# Patient Record
Sex: MALE | Race: WHITE | NOT HISPANIC OR LATINO | Employment: OTHER | URBAN - METROPOLITAN AREA
[De-identification: names, ages, dates, MRNs, and addresses within clinical notes are randomized per-mention and may not be internally consistent; named-entity substitution may affect disease eponyms.]

---

## 2023-03-02 LAB
CAMPYLOBACTER GP: NOT DETECTED
NOROVIRUS GI/GII: NOT DETECTED
ROTAVIRUS A: NOT DETECTED
SALMONELLA SP.: NOT DETECTED
SHIGA TOXIN 1: NOT DETECTED
SHIGA TOXIN 2: NOT DETECTED
SHIGELLA SP.: NOT DETECTED
VIBRIO GRP.: NOT DETECTED
YERSINIA ENTEROCOLITICA: NOT DETECTED

## 2023-03-03 LAB — C. DIFFICILE TOXIN, PCR: NOT DETECTED

## 2023-03-07 LAB
CRYPTOSPORIDIUM ANTIGEN-DATA CONVERSION: NEGATIVE
GIARDIA LAMBLIA AG-DATA CONVERSION: NEGATIVE
OVA + PARASITE EXAM: NEGATIVE
PANCREATIC ELASTASE, FECAL: 307 UG/G

## 2023-05-12 ENCOUNTER — HOSPITAL ENCOUNTER (OUTPATIENT)
Dept: DATA CONVERSION | Facility: HOSPITAL | Age: 68
End: 2023-05-12
Attending: INTERNAL MEDICINE | Admitting: INTERNAL MEDICINE
Payer: MEDICARE

## 2023-05-12 DIAGNOSIS — Z86.73 PERSONAL HISTORY OF TRANSIENT ISCHEMIC ATTACK (TIA), AND CEREBRAL INFARCTION WITHOUT RESIDUAL DEFICITS: ICD-10-CM

## 2023-05-12 DIAGNOSIS — I25.119 ATHEROSCLEROTIC HEART DISEASE OF NATIVE CORONARY ARTERY WITH UNSPECIFIED ANGINA PECTORIS (CMS-HCC): ICD-10-CM

## 2023-05-12 DIAGNOSIS — I25.2 OLD MYOCARDIAL INFARCTION: ICD-10-CM

## 2023-05-12 DIAGNOSIS — I73.9 PERIPHERAL VASCULAR DISEASE, UNSPECIFIED (CMS-HCC): ICD-10-CM

## 2023-05-12 DIAGNOSIS — I25.10 ATHEROSCLEROTIC HEART DISEASE OF NATIVE CORONARY ARTERY WITHOUT ANGINA PECTORIS: ICD-10-CM

## 2023-05-12 DIAGNOSIS — Z87.891 PERSONAL HISTORY OF NICOTINE DEPENDENCE: ICD-10-CM

## 2023-05-12 DIAGNOSIS — T82.855A STENOSIS OF CORONARY ARTERY STENT, INITIAL ENCOUNTER: ICD-10-CM

## 2023-05-12 DIAGNOSIS — I20.89 OTHER FORMS OF ANGINA PECTORIS (CMS-HCC): ICD-10-CM

## 2023-05-23 DIAGNOSIS — I25.10 ATHEROSCLEROTIC HEART DISEASE OF NATIVE CORONARY ARTERY WITHOUT ANGINA PECTORIS: ICD-10-CM

## 2023-05-23 DIAGNOSIS — E11.51 TYPE 2 DIABETES MELLITUS WITH DIABETIC PERIPHERAL ANGIOPATHY WITHOUT GANGRENE (MULTI): ICD-10-CM

## 2023-05-23 DIAGNOSIS — E11.65 TYPE 2 DIABETES MELLITUS WITH HYPERGLYCEMIA (MULTI): ICD-10-CM

## 2023-05-26 RX ORDER — SITAGLIPTIN 100 MG/1
TABLET, FILM COATED ORAL
Qty: 30 TABLET | Refills: 6 | OUTPATIENT
Start: 2023-05-26

## 2023-05-26 RX ORDER — METOPROLOL TARTRATE 25 MG/1
TABLET, FILM COATED ORAL
Qty: 180 TABLET | Refills: 1 | OUTPATIENT
Start: 2023-05-26

## 2023-06-07 DIAGNOSIS — E78.5 HYPERLIPIDEMIA, UNSPECIFIED: ICD-10-CM

## 2023-06-09 RX ORDER — ATORVASTATIN CALCIUM 80 MG/1
TABLET, FILM COATED ORAL
Qty: 90 TABLET | Refills: 3 | Status: SHIPPED | OUTPATIENT
Start: 2023-06-09 | End: 2023-10-18 | Stop reason: SDUPTHER

## 2023-09-07 VITALS — BODY MASS INDEX: 27.03 KG/M2 | WEIGHT: 162.26 LBS | HEIGHT: 65 IN

## 2023-09-16 PROBLEM — I73.9 PAD (PERIPHERAL ARTERY DISEASE) (CMS-HCC): Status: ACTIVE | Noted: 2023-09-16

## 2023-09-16 PROBLEM — G45.9 TIA (TRANSIENT ISCHEMIC ATTACK): Status: ACTIVE | Noted: 2023-09-16

## 2023-09-16 PROBLEM — E11.9 DIABETES MELLITUS TYPE 2, NONINSULIN DEPENDENT (MULTI): Status: ACTIVE | Noted: 2023-09-16

## 2023-09-16 PROBLEM — R19.7 DIARRHEA: Status: ACTIVE | Noted: 2023-09-16

## 2023-09-16 PROBLEM — K58.9 IBS (IRRITABLE BOWEL SYNDROME): Status: ACTIVE | Noted: 2023-09-16

## 2023-09-16 PROBLEM — N40.0 BPH (BENIGN PROSTATIC HYPERPLASIA): Status: ACTIVE | Noted: 2023-09-16

## 2023-09-16 PROBLEM — K27.4 PEPTIC ULCER WITH HEMORRHAGE: Status: ACTIVE | Noted: 2023-09-16

## 2023-09-16 PROBLEM — E78.5 HYPERLIPIDEMIA: Status: ACTIVE | Noted: 2023-09-16

## 2023-09-16 PROBLEM — I10 HYPERTENSION: Status: ACTIVE | Noted: 2023-09-16

## 2023-09-16 PROBLEM — I25.10 CAD (CORONARY ARTERY DISEASE): Status: ACTIVE | Noted: 2023-09-16

## 2023-09-16 PROBLEM — K21.9 GERD (GASTROESOPHAGEAL REFLUX DISEASE): Status: ACTIVE | Noted: 2023-09-16

## 2023-09-16 RX ORDER — TAMSULOSIN HYDROCHLORIDE 0.4 MG/1
1 CAPSULE ORAL NIGHTLY
COMMUNITY
Start: 2021-04-27 | End: 2023-11-08 | Stop reason: SDUPTHER

## 2023-09-16 RX ORDER — ACETAMINOPHEN 325 MG/1
650 TABLET ORAL EVERY 6 HOURS PRN
COMMUNITY
Start: 2021-10-25

## 2023-09-16 RX ORDER — CLOPIDOGREL BISULFATE 75 MG/1
75 TABLET ORAL DAILY
COMMUNITY
End: 2023-10-18 | Stop reason: SDUPTHER

## 2023-09-16 RX ORDER — CETIRIZINE HYDROCHLORIDE 10 MG/1
10 TABLET ORAL DAILY
COMMUNITY
Start: 2023-05-23

## 2023-09-16 RX ORDER — CYCLOBENZAPRINE HCL 5 MG
10 TABLET ORAL 3 TIMES DAILY PRN
COMMUNITY
Start: 2022-09-16 | End: 2023-10-18 | Stop reason: ALTCHOICE

## 2023-09-16 RX ORDER — TRIAMCINOLONE ACETONIDE 1 MG/G
CREAM TOPICAL 2 TIMES DAILY
COMMUNITY
Start: 2023-02-14 | End: 2024-03-25

## 2023-09-16 RX ORDER — SITAGLIPTIN 100 MG/1
100 TABLET, FILM COATED ORAL DAILY
COMMUNITY
End: 2023-10-18 | Stop reason: SINTOL

## 2023-09-16 RX ORDER — ACETAMINOPHEN, DIPHENHYDRAMINE HCL, PHENYLEPHRINE HCL 325; 25; 5 MG/1; MG/1; MG/1
TABLET ORAL
COMMUNITY
Start: 2021-10-25

## 2023-09-16 RX ORDER — DICYCLOMINE HYDROCHLORIDE 10 MG/1
10 CAPSULE ORAL
COMMUNITY
Start: 2023-05-23 | End: 2023-10-18 | Stop reason: ALTCHOICE

## 2023-09-16 RX ORDER — RANOLAZINE 500 MG/1
500 TABLET, EXTENDED RELEASE ORAL EVERY 12 HOURS
COMMUNITY

## 2023-09-16 RX ORDER — METOPROLOL TARTRATE 25 MG/1
1 TABLET, FILM COATED ORAL 2 TIMES DAILY
COMMUNITY
Start: 2019-10-15 | End: 2023-10-18 | Stop reason: SDUPTHER

## 2023-09-16 RX ORDER — ISOSORBIDE MONONITRATE 120 MG/1
120 TABLET, EXTENDED RELEASE ORAL NIGHTLY
COMMUNITY
Start: 2023-05-02 | End: 2023-10-18 | Stop reason: SDUPTHER

## 2023-09-16 RX ORDER — APIXABAN 5 MG/1
5 TABLET, FILM COATED ORAL 2 TIMES DAILY
COMMUNITY
End: 2023-10-18 | Stop reason: SDUPTHER

## 2023-09-16 RX ORDER — LISINOPRIL 10 MG/1
10 TABLET ORAL DAILY
COMMUNITY
End: 2023-10-18 | Stop reason: SDUPTHER

## 2023-09-16 RX ORDER — POTASSIUM CHLORIDE 1500 MG/1
20 TABLET, EXTENDED RELEASE ORAL 2 TIMES DAILY
COMMUNITY
Start: 2023-02-17 | End: 2023-10-18

## 2023-09-16 RX ORDER — AMMONIUM LACTATE 12 G/100G
LOTION TOPICAL
COMMUNITY
Start: 2023-02-14

## 2023-09-16 RX ORDER — PANTOPRAZOLE SODIUM 40 MG/1
40 TABLET, DELAYED RELEASE ORAL
COMMUNITY
End: 2023-10-18

## 2023-09-16 RX ORDER — ISOSORBIDE MONONITRATE 60 MG/1
60 TABLET, EXTENDED RELEASE ORAL DAILY
COMMUNITY
End: 2023-10-18 | Stop reason: SDUPTHER

## 2023-09-16 RX ORDER — ONDANSETRON 8 MG/1
8 TABLET, ORALLY DISINTEGRATING ORAL EVERY 8 HOURS PRN
COMMUNITY
End: 2024-03-25

## 2023-09-16 RX ORDER — CLOTRIMAZOLE AND BETAMETHASONE DIPROPIONATE 10; .64 MG/G; MG/G
1 CREAM TOPICAL 2 TIMES DAILY
COMMUNITY
Start: 2023-03-31

## 2023-10-04 ENCOUNTER — APPOINTMENT (OUTPATIENT)
Dept: UROLOGY | Facility: CLINIC | Age: 68
End: 2023-10-04
Payer: MEDICARE

## 2023-10-18 ENCOUNTER — OFFICE VISIT (OUTPATIENT)
Dept: CARDIOLOGY | Facility: HOSPITAL | Age: 68
End: 2023-10-18
Payer: MEDICARE

## 2023-10-18 VITALS
SYSTOLIC BLOOD PRESSURE: 122 MMHG | HEART RATE: 61 BPM | WEIGHT: 156.97 LBS | BODY MASS INDEX: 25.38 KG/M2 | OXYGEN SATURATION: 97 % | DIASTOLIC BLOOD PRESSURE: 77 MMHG

## 2023-10-18 DIAGNOSIS — I10 ESSENTIAL HYPERTENSION: ICD-10-CM

## 2023-10-18 DIAGNOSIS — E78.5 HYPERLIPIDEMIA, UNSPECIFIED: ICD-10-CM

## 2023-10-18 DIAGNOSIS — I25.10 CORONARY ARTERY DISEASE INVOLVING NATIVE HEART WITHOUT ANGINA PECTORIS, UNSPECIFIED VESSEL OR LESION TYPE: Primary | ICD-10-CM

## 2023-10-18 DIAGNOSIS — I48.91 ATRIAL FIBRILLATION, UNSPECIFIED TYPE (MULTI): ICD-10-CM

## 2023-10-18 PROCEDURE — 1160F RVW MEDS BY RX/DR IN RCRD: CPT | Performed by: NURSE PRACTITIONER

## 2023-10-18 PROCEDURE — 3074F SYST BP LT 130 MM HG: CPT | Performed by: NURSE PRACTITIONER

## 2023-10-18 PROCEDURE — 99213 OFFICE O/P EST LOW 20 MIN: CPT | Mod: PO | Performed by: NURSE PRACTITIONER

## 2023-10-18 PROCEDURE — 3078F DIAST BP <80 MM HG: CPT | Performed by: NURSE PRACTITIONER

## 2023-10-18 PROCEDURE — 1159F MED LIST DOCD IN RCRD: CPT | Performed by: NURSE PRACTITIONER

## 2023-10-18 PROCEDURE — 3046F HEMOGLOBIN A1C LEVEL >9.0%: CPT | Performed by: NURSE PRACTITIONER

## 2023-10-18 PROCEDURE — 4010F ACE/ARB THERAPY RXD/TAKEN: CPT | Performed by: NURSE PRACTITIONER

## 2023-10-18 PROCEDURE — 1125F AMNT PAIN NOTED PAIN PRSNT: CPT | Performed by: NURSE PRACTITIONER

## 2023-10-18 PROCEDURE — 99213 OFFICE O/P EST LOW 20 MIN: CPT | Performed by: NURSE PRACTITIONER

## 2023-10-18 PROCEDURE — 1036F TOBACCO NON-USER: CPT | Performed by: NURSE PRACTITIONER

## 2023-10-18 RX ORDER — ISOSORBIDE MONONITRATE 120 MG/1
120 TABLET, EXTENDED RELEASE ORAL NIGHTLY
Qty: 90 TABLET | Refills: 3 | Status: SHIPPED | OUTPATIENT
Start: 2023-10-18 | End: 2023-11-29 | Stop reason: SDUPTHER

## 2023-10-18 RX ORDER — LISINOPRIL 10 MG/1
10 TABLET ORAL DAILY
Qty: 90 TABLET | Refills: 3 | Status: SHIPPED | OUTPATIENT
Start: 2023-10-18 | End: 2024-10-17

## 2023-10-18 RX ORDER — APIXABAN 5 MG/1
5 TABLET, FILM COATED ORAL 2 TIMES DAILY
Qty: 180 TABLET | Refills: 3 | Status: SHIPPED | OUTPATIENT
Start: 2023-10-18 | End: 2024-10-17

## 2023-10-18 RX ORDER — CLOPIDOGREL BISULFATE 75 MG/1
75 TABLET ORAL DAILY
Qty: 90 TABLET | Refills: 3 | Status: SHIPPED | OUTPATIENT
Start: 2023-10-18 | End: 2024-10-17

## 2023-10-18 RX ORDER — METOPROLOL TARTRATE 25 MG/1
25 TABLET, FILM COATED ORAL 2 TIMES DAILY
Qty: 180 TABLET | Refills: 3 | Status: SHIPPED | OUTPATIENT
Start: 2023-10-18 | End: 2024-10-17

## 2023-10-18 RX ORDER — ATORVASTATIN CALCIUM 80 MG/1
80 TABLET, FILM COATED ORAL NIGHTLY
Qty: 90 TABLET | Refills: 3 | Status: SHIPPED | OUTPATIENT
Start: 2023-10-18

## 2023-10-18 ASSESSMENT — ENCOUNTER SYMPTOMS
PSYCHIATRIC NEGATIVE: 1
DYSPNEA ON EXERTION: 1
MYALGIAS: 1
EYES NEGATIVE: 1
NEUROLOGICAL NEGATIVE: 1
HEMATOLOGIC/LYMPHATIC NEGATIVE: 1
RESPIRATORY NEGATIVE: 1
GASTROINTESTINAL NEGATIVE: 1
ENDOCRINE NEGATIVE: 1
DEPRESSION: 0

## 2023-10-18 NOTE — PROGRESS NOTES
Referred by Dr. Francis ref. provider found for Follow-up (Routine.)     History Of Present Illness:    Amor Guzmán is a very pleasant 68 year old gentleman with a history of CAD, PVD, HTN and HLD, he is here for a follow up visit. The patient is seen in collaboration with Dr. Woodruff. Mr. Guzmán was admitted in August with chest pain, the pain has since resolved. He was started on Insulin due to elevated HgA1C. He states his sugar has been better controlled. Denies chest pain or shortness of breath. Occasional tightness in his his neck. He is walking daily and staying active.     Review of Systems   Constitutional: Positive for malaise/fatigue.   HENT: Negative.     Eyes: Negative.    Cardiovascular:  Positive for dyspnea on exertion.   Respiratory: Negative.     Endocrine: Negative.    Hematologic/Lymphatic: Negative.    Skin: Negative.    Musculoskeletal:  Positive for arthritis, muscle weakness and myalgias.   Gastrointestinal: Negative.    Neurological: Negative.    Psychiatric/Behavioral: Negative.        Past Medical History:  He has a past medical history of Chronic or unspecified duodenal ulcer with hemorrhage (03/12/2021), Encounter for follow-up examination after completed treatment for conditions other than malignant neoplasm (11/01/2021), Encounter for general adult medical examination without abnormal findings (04/15/2022), Encounter for preprocedural cardiovascular examination (09/18/2020), Encounter for screening for malignant neoplasm of colon (01/14/2022), Encounter for screening for other viral diseases (01/05/2021), Enterocolitis due to Clostridium difficile, not specified as recurrent (11/02/2021), Immunization not carried out because of patient refusal (10/16/2020), Immunization not carried out because of patient refusal (10/16/2020), Infection following a procedure, other surgical site, initial encounter (11/12/2021), Infection following a procedure, other surgical site, initial encounter  (11/12/2021), Infectious gastroenteritis and colitis, unspecified (03/17/2021), Low back pain, unspecified (09/29/2021), Male erectile dysfunction, unspecified (07/08/2021), Other abnormal glucose (10/16/2020), Other conditions influencing health status (04/15/2022), Other forms of angina pectoris (07/08/2021), Other specified diseases of gallbladder (09/02/2020), Other specified diseases of gallbladder (10/07/2020), Other specified symptoms and signs involving the circulatory and respiratory systems (09/24/2021), Pain in unspecified hip (09/01/2021), Pain in unspecified thigh (09/01/2021), Personal history of diseases of the blood and blood-forming organs and certain disorders involving the immune mechanism (11/01/2021), Personal history of diseases of the skin and subcutaneous tissue (11/12/2021), Personal history of other diseases of the digestive system (11/02/2021), Personal history of other diseases of the musculoskeletal system and connective tissue (09/29/2021), Personal history of other endocrine, nutritional and metabolic disease (09/21/2021), Personal history of other endocrine, nutritional and metabolic disease (11/01/2021), Personal history of other infectious and parasitic diseases (11/12/2021), Personal history of other malignant neoplasm of large intestine (09/16/2021), Personal history of other malignant neoplasm of large intestine, Personal history of other specified conditions (06/24/2021), Personal history of other specified conditions (03/17/2021), Personal history of other specified conditions (09/21/2021), Personal history of peptic ulcer disease, Presence of aortocoronary bypass graft (11/17/2021), Presence of aortocoronary bypass graft (11/22/2021), and Unspecified systolic (congestive) heart failure (CMS/HCC) (09/24/2021).    Past Surgical History:  He has a past surgical history that includes Other surgical history (10/16/2020); Other surgical history (10/16/2020); Other surgical history  (11/01/2021); MR angio neck wo IV contrast (8/26/2022); MR angio head wo IV contrast (8/26/2022); CT angio neck w and wo IV contrast (8/26/2022); and CT angio head w and wo IV contrast (8/26/2022).      Social History:  He reports that he quit smoking about 20 years ago. His smoking use included cigarettes. He smoked an average of .25 packs per day. He has never used smokeless tobacco. He reports that he does not currently use alcohol. He reports that he does not use drugs.    Family History:  Family History   Problem Relation Name Age of Onset    Other (cardiac disorder) Mother      Hypertension Mother      Lung cancer Mother      Other (cardiac disorder) Father      Hypertension Father      Prostate cancer Father          Allergies:  Patient has no known allergies.    Outpatient Medications:  Current Outpatient Medications   Medication Instructions    acetaminophen (TYLENOL) 650 mg, oral, Every 6 hours PRN    ammonium lactate (Lac-Hydrin) 12 % lotion  Apply to affected area as needed for dry skin.<BR>    atorvastatin (Lipitor) 80 mg tablet take 1 tablet by mouth at bedtime    cetirizine (ZYRTEC) 10 mg, oral, Daily    clopidogrel (PLAVIX) 75 mg, oral, Daily    clotrimazole-betamethasone (Lotrisone) cream 1 Application, Topical, 2 times daily    Eliquis 5 mg, oral, 2 times daily    empagliflozin (JARDIANCE ORAL) oral, Daily    isosorbide mononitrate ER (IMDUR) 120 mg, oral, Nightly    lisinopril 10 mg, oral, Daily    melatonin 10 mg tablet oral, 1 qhs prn    metoprolol tartrate (Lopressor) 25 mg tablet 1 tablet, oral, 2 times daily    multivitamin with minerals (MULTIPLE VITAMIN-MINERALS ORAL) 1 tablet, oral, Daily    ondansetron ODT (ZOFRAN-ODT) 8 mg, oral, Every 8 hours PRN    ranolazine (RANEXA) 500 mg, oral, Every 12 hours    tamsulosin (Flomax) 0.4 mg 24 hr capsule 1 capsule, oral, Nightly    triamcinolone (Kenalog) 0.1 % cream 2 times daily        Last Recorded Vitals:  Vitals:    10/18/23 1340   BP: 122/77    Pulse: 61   SpO2: 97%   Weight: 71.2 kg (156 lb 15.5 oz)       Physical Exam:  Physical Exam  Vitals reviewed.   HENT:      Head: Normocephalic.      Nose: Nose normal.   Eyes:      Pupils: Pupils are equal, round, and reactive to light.   Cardiovascular:      Rate and Rhythm: Normal rate and regular rhythm.   Pulmonary:      Effort: Pulmonary effort is normal.      Breath sounds: Normal breath sounds.   Abdominal:      General: Abdomen is flat.      Palpations: Abdomen is soft.   Musculoskeletal:         General: Normal range of motion.      Cervical back: Normal range of motion.   Skin:     General: Skin is warm and dry.   Neurological:      General: No focal deficit present.      Mental Status: He is alert and oriented to person, place, and time.   Psychiatric:         Mood and Affect: Mood normal.            Last Labs:  CBC -  Lab Results   Component Value Date    WBC 7.4 08/17/2023    HGB 14.7 08/17/2023    HCT 45.4 08/17/2023    MCV 87 08/17/2023     08/17/2023       CMP -  Lab Results   Component Value Date    CALCIUM 8.3 (L) 08/18/2023    PHOS 2.6 08/27/2022    PROT 6.6 08/16/2023    ALBUMIN 4.1 08/16/2023    AST 14 08/16/2023    ALT 15 08/16/2023    ALKPHOS 91 08/16/2023    BILITOT 1.1 08/16/2023       LIPID PANEL -   Lab Results   Component Value Date    CHOL CANCELED 05/03/2023    TRIG CANCELED 05/03/2023    HDL CANCELED 05/03/2023    CHHDL CANCELED 05/03/2023    LDLF CANCELED 05/03/2023    VLDL CANCELED 05/03/2023    NHDL CANCELED 05/03/2023       RENAL FUNCTION PANEL -   Lab Results   Component Value Date    GLUCOSE 223 (H) 08/18/2023     (L) 08/18/2023    K 3.7 08/18/2023     08/18/2023    CO2 23 08/18/2023    ANIONGAP 13 08/18/2023    BUN 15 08/18/2023    CREATININE 0.77 08/18/2023    GFRMALE >90 08/18/2023    CALCIUM 8.3 (L) 08/18/2023    PHOS 2.6 08/27/2022    ALBUMIN 4.1 08/16/2023        Lab Results   Component Value Date    BNP 78 08/26/2022    HGBA1C 10.6 (H) 10/02/2023        Last Cardiology Tests:  ECG:    Echo:  Echocardiogram 5/2/2023  1. Left ventricular systolic function is normal with a 55-60% estimated ejection fraction.  2. Abnormal septal motion consistent with post-operative status.  3. Spectral Doppler shows an impaired relaxation pattern of left ventricular diastolic filling.  4. There is mild mitral, tricuspid and pulmonic regurgitation.    Echocardiogram 8/26/2022  1. Left ventricular systolic function is normal with a 55-60% estimated ejection fraction.  2. There is mild mitral and tricuspid regurgitation.  3. There is no evidence of a patent foramen ovale.    Echocardiogram 10/18/2021  1. The left ventricular systolic function is low normal with a 50-55% estimated  ejection fraction.  2. Apical septal segment is abnormal.  3. Abnormal septal motion consistent with post-operative status.  4. The left atrium is mild to moderately dilated.  5. RVSP within normal limits.  6. Small pericardial effusion.    Echocardiogram 10/11/2021  1. The left ventricular systolic function is normal with a 55-60% estimated  ejection fraction.  2. Abnormal septal motion consistent with post-operative status.  3. Spectral Doppler shows an impaired relaxation pattern of left ventricular  diastolic filling.  4. There is eccentric left ventricular hypertrophy.  5. RVSP within normal limits.    Echocardiogram 6/23/2021  1. The left ventricular systolic function is normal with a 55-60% estimated  ejection fraction.  2. Spectral Doppler shows an abnormal pattern of left ventricular diastolic  filling.  3. There is trace-mild mitral, tricuspid and pulmonic regurgitation.      Echocardiogram 10/5/2020   1. The left ventricular systolic function is mildly decreased with a 45%  estimated ejection fraction.  2. Apical septal segment, apical anterior segment, and apex are abnormal.  3. Spectral Doppler shows an impaired relaxation pattern of left ventricular  diastolic filling.  4. There is mild  tricuspid regurgitation.  5. The estimated pulmonary artery pressure is mildly elevated with the RVSP at  39.9 mmHg.    Echocardiogram 7/23/2018   1. The left ventricular systolic function is normal with a 55-60% estimated  ejection fraction.   2. Spectral Doppler shows an impaired relaxation pattern of left ventricular  diastolic filling.  Ejection Fractions:  LVEF 55-60%  Cath:  Heart cath 5/12/2023  . Left main: no significant angiographic disease.  2. LAD: 70% diffuse proximal ISR, 90% mid-ISR.  3. LCx: 100%: proximal ISR.  4. RCA: nondominant, 99% proximal .  5. Grafts: (1) Patent RUBEN to LAD (2) Patent SVG-Ramus (3) patent radial jump graft off SVG to LPDA.  6. LVEDP 13mmHg, no aortic stenosis on LV-Ao gradient.    Left heart cath 10/5/2021  1. Left main: no significant angiographic disease.  2. LAD: 90% diffuse prox-mid ISR.  3. LCx: 95% proximal ISR.  4. RCA: 40% mid-vessel disease.  5. Grafts: (1) LIMA to LAD atretic/occluded (2) SVG to ?ramus patent without  disease.  6. LVEDP 9mmHg, no aortic stenosis on LV-Ao gradient.    Left heart cath 10/4/2020   1. The ramus intermedius showed severe atherosclerotic disease.  2. Severe multivessel CAD with evidence of chronically occluded LIMA and patent  SVG to RI.  3. Evidence of severe mid LAD ISR (99%) with CELY I flow S/P successful IVUS  guided POBA.  4. Given the history of recent severe GIB, angiomax infusion was initiated in  lab, to be continued at low dose for a total of 6 hours post PCI.  5. Future laser atherectomy and PCI of severe LAD ISR vs brachytherapy could be  considered of the patient's GIB is treated    Cardiac cath 4/16/2015   * There is significant single-vessel and branch coronary artery disease  in this right dominant system. The left main is unremarkable. The  ostial to proximal LAD stent has a 70% ostial in-stent restenotic  lesion. The mid LAD has a focal 70% in-stent restenotic lesion. The  more distal LAD has mild diffuse disease.  The large ramus has a 60%  ostial stenosis. The LCx has a 50% mid vessel in-stent restenotic  lesion. The dominant right coronary artery has an elongated 40%  proximal to mid vessel lesion.  * The left subclavian is normal, and the LIMA is a good caliber vessel  if needed for CABG surgery.  * The aortobifemoral graft is widely patent and free of significant  disease.    Cardiac cath 3/17/2011   * Angiographic summary: LM: patent; LCx: patent including widely patent  OM2 stent; LAD: patent proximal stent but high grade stenosis of the  midLAD at site of prior stenting x 2; RCA: patent.  * LVEDP=12mmHg at the end of the case.  * Abdominal aortogram: Patent renal arteries with 50% right RA stenosis;  Patent aorto-bifemoral bypass.  * Cath done via the right radial artery without apparent complication.  * Successful PCI of the mid LAD with high pressure PTCA alone (no  additional stents used) using a 2.75mm quamtum maverick balloon.  Stress Test:  Nuclear stress test 6/23/2021  1. No stress-induced ischemia or infarct.  2. Normal LV size with normal LVEF.  3. Mild septal dyskinesis consistent with prior CABG.       Cardiac Imaging:  Operative report 10/15/2021  1. Standard redo median sternotomy.  2. Standard cannulation.  3. Coronary artery bypass graft x2 with the left internal mammary   artery to the left anterior descending and left radial artery   to the posterolateral circumflex.         Assessment/Plan     Mr. Guzmán is a very pleasant 68 year old gentleman with a history of CVA, HTN, HLD, PVD s/p aortobifemoral bypass, and CAD s/p CABG x3 ( sequential skeletonized left internal mammary artery to diagonal and left anterior descending and endoscopic saphenous vein graft to ramus.), he was admitted 10/2020 with an NSTEMI, with a PTCA to 95% mid-LAD ISR, he had recently been treated for a GIB with clipping and epi for a duodenal ulcer. Several days later he continued to have increased unstable angina and had a PCI  to severe ostial LAD, prox LAD ISR and mid LAD, he was recently admitted (10/3/2021)with chest pain, LHC (10/5/2021) showed severe LAD and LCx severely obstructed in-stent restenosis. He was transferred to [facility] [address] and had a redo CABG on 10/15/2021, CABG x2 with left internal mammary artery to the left anterior descending and left radial artery to the posterolateral circumflex. He was recently admitted with chest pain, the chest pain has since resolved.  He continues to stay active working in the yard. Heart rate and blood pressure are well controlled today.    Plan  -call with any questions   -follow up in May   -monitor blood pressure at home   -continue Plavix indefinitely and Eliquis   -continue Atorvastatin, and Metoprolol   -continue Imdur and Ranexa   -continue Lisinopril 10 mg daily      Meghna Ricardo, APRN-CNP

## 2023-10-19 ENCOUNTER — HOSPITAL ENCOUNTER (EMERGENCY)
Facility: HOSPITAL | Age: 68
Discharge: HOME | End: 2023-10-19
Payer: COMMERCIAL

## 2023-10-19 VITALS
HEART RATE: 69 BPM | BODY MASS INDEX: 25.66 KG/M2 | OXYGEN SATURATION: 96 % | WEIGHT: 154 LBS | DIASTOLIC BLOOD PRESSURE: 84 MMHG | TEMPERATURE: 96.8 F | HEIGHT: 65 IN | RESPIRATION RATE: 19 BRPM | SYSTOLIC BLOOD PRESSURE: 144 MMHG

## 2023-10-19 DIAGNOSIS — H61.23 BILATERAL IMPACTED CERUMEN: Primary | ICD-10-CM

## 2023-10-19 PROCEDURE — 99283 EMERGENCY DEPT VISIT LOW MDM: CPT

## 2023-10-19 PROCEDURE — 99282 EMERGENCY DEPT VISIT SF MDM: CPT | Mod: 25

## 2023-10-19 PROCEDURE — 69210 REMOVE IMPACTED EAR WAX UNI: CPT | Mod: 50

## 2023-10-19 ASSESSMENT — LIFESTYLE VARIABLES
EVER FELT BAD OR GUILTY ABOUT YOUR DRINKING: NO
HAVE PEOPLE ANNOYED YOU BY CRITICIZING YOUR DRINKING: NO
REASON UNABLE TO ASSESS: NO
HAVE YOU EVER FELT YOU SHOULD CUT DOWN ON YOUR DRINKING: NO
EVER HAD A DRINK FIRST THING IN THE MORNING TO STEADY YOUR NERVES TO GET RID OF A HANGOVER: NO

## 2023-10-19 ASSESSMENT — COLUMBIA-SUICIDE SEVERITY RATING SCALE - C-SSRS
1. IN THE PAST MONTH, HAVE YOU WISHED YOU WERE DEAD OR WISHED YOU COULD GO TO SLEEP AND NOT WAKE UP?: NO
6. HAVE YOU EVER DONE ANYTHING, STARTED TO DO ANYTHING, OR PREPARED TO DO ANYTHING TO END YOUR LIFE?: NO
6. HAVE YOU EVER DONE ANYTHING, STARTED TO DO ANYTHING, OR PREPARED TO DO ANYTHING TO END YOUR LIFE?: NO
2. HAVE YOU ACTUALLY HAD ANY THOUGHTS OF KILLING YOURSELF?: NO

## 2023-10-19 ASSESSMENT — PAIN - FUNCTIONAL ASSESSMENT: PAIN_FUNCTIONAL_ASSESSMENT: 0-10

## 2023-10-19 NOTE — ED PROVIDER NOTES
HPI   Chief Complaint   Patient presents with    Hearing Problem     Pt presents to the ER with trouble hearing out of his ears, pt states this has been going on for x1 wk. PT states that he has an earache to the left ear at this time, but denies any other complaints.        Is a 68-year-old male coming in for decreased hearing.      History provided by:  Patient                      [place] Coma Scale Score: 15                  Patient History   Past Medical History:   Diagnosis Date    Chronic or unspecified duodenal ulcer with hemorrhage 03/12/2021    Bleeding duodenal ulcer    Encounter for follow-up examination after completed treatment for conditions other than malignant neoplasm 11/01/2021    Hospital discharge follow-up    Encounter for general adult medical examination without abnormal findings 04/15/2022    Medicare annual wellness visit, initial    Encounter for preprocedural cardiovascular examination 09/18/2020    Preop cardiovascular exam    Encounter for screening for malignant neoplasm of colon 01/14/2022    Encounter for screening colonoscopy    Encounter for screening for other viral diseases 01/05/2021    Need for hepatitis C screening test    Enterocolitis due to Clostridium difficile, not specified as recurrent 11/02/2021    Clostridium difficile diarrhea    Immunization not carried out because of patient refusal 10/16/2020    Pneumococcal vaccination declined    Immunization not carried out because of patient refusal 10/16/2020    Influenza vaccination declined by patient    Infection following a procedure, other surgical site, initial encounter 11/12/2021    Incisional infection    Infection following a procedure, other surgical site, initial encounter 11/12/2021    Incisional infection    Infectious gastroenteritis and colitis, unspecified 03/17/2021    Colitis - presumed infectious origin    Low back pain, unspecified 09/29/2021    Lumbar pain    Male erectile dysfunction, unspecified  07/08/2021    Vasculogenic erectile dysfunction, unspecified vasculogenic erectile dysfunction type    Other abnormal glucose 10/16/2020    Abnormal blood sugar    Other conditions influencing health status 04/15/2022    DM (diabetes mellitus) type II uncontrolled, periph vascular disorder    Other forms of angina pectoris 07/08/2021    Angina at rest    Other specified diseases of gallbladder 09/02/2020    Porcelain gallbladder    Other specified diseases of gallbladder 10/07/2020    Porcelain gallbladder    Other specified symptoms and signs involving the circulatory and respiratory systems 09/24/2021    Carotid bruit    Pain in unspecified hip 09/01/2021    Joint pain, hip    Pain in unspecified thigh 09/01/2021    Pain of thigh, unspecified laterality    Personal history of diseases of the blood and blood-forming organs and certain disorders involving the immune mechanism 11/01/2021    History of anemia    Personal history of diseases of the skin and subcutaneous tissue 11/12/2021    History of cellulitis    Personal history of other diseases of the digestive system 11/02/2021    History of duodenal ulcer    Personal history of other diseases of the musculoskeletal system and connective tissue 09/29/2021    History of low back pain    Personal history of other endocrine, nutritional and metabolic disease 09/21/2021    History of hyperglycemia    Personal history of other endocrine, nutritional and metabolic disease 11/01/2021    History of diabetes mellitus    Personal history of other infectious and parasitic diseases 11/12/2021    History of Clostridium difficile colitis    Personal history of other malignant neoplasm of large intestine 09/16/2021    History of colon cancer    Personal history of other malignant neoplasm of large intestine     History of malignant neoplasm of colon    Personal history of other specified conditions 06/24/2021    History of urinary hesitancy    Personal history of other  specified conditions 2021    History of diarrhea    Personal history of other specified conditions 2021    History of abdominal pain    Personal history of peptic ulcer disease     History of bleeding peptic ulcer    Presence of aortocoronary bypass graft 2021    S/P CABG x 2    Presence of aortocoronary bypass graft 2021    S/P CABG (coronary artery bypass graft)    Unspecified systolic (congestive) heart failure (CMS/HCC) 2021    ACC/AHA stage B systolic heart failure due to ischemic cardiomyopathy     Past Surgical History:   Procedure Laterality Date    CT HEAD ANGIO W AND WO IV CONTRAST  2022    CT HEAD ANGIO W AND WO IV CONTRAST 2022 [facility]    CT NECK ANGIO W AND WO IV CONTRAST  2022    CT NECK ANGIO W AND WO IV CONTRAST 2022 [facility]    MR HEAD ANGIO WO IV CONTRAST  2022    MR HEAD ANGIO WO IV CONTRAST 2022 [facility]GAC[facility]    MR NECK ANGIO WO IV CONTRAST  2022    MR NECK ANGIO WO IV CONTRAST 2022 [facility]ICAL [facility]GAC[facility]    OTHER SURGICAL HISTORY  10/16/2020    Gallbladder surgery    OTHER SURGICAL HISTORY  10/16/2020    Cardiac catheterization    OTHER SURGICAL HISTORY  2021    Coronary artery bypass graft     Family History   Problem Relation Name Age of Onset    Other (cardiac disorder) Mother      Hypertension Mother      Lung cancer Mother      Other (cardiac disorder) Father      Hypertension Father      Prostate cancer Father       Social History     Tobacco Use    Smoking status: Former     Packs/day: .25     Types: Cigarettes     Quit date:      Years since quittin.8    Smokeless tobacco: Never   Substance Use Topics    Alcohol use: Not Currently    Drug use: Never       Physical Exam   ED Triage Vitals [10/19/23 1400]   Temp Heart Rate Resp BP   36 °C (96.8 °F) 69 19 144/84      SpO2 Temp Source Heart Rate Source Patient Position   96 % Tympanic Monitor Sitting      BP Location FiO2 (%)      Left arm --       Physical Exam  Vitals and nursing note reviewed.   Constitutional:       General: He is not in acute distress.     Appearance: Normal appearance. He is not ill-appearing or toxic-appearing.   HENT:      Head: Normocephalic and atraumatic.      Right Ear: Decreased hearing noted. There is impacted cerumen.      Left Ear: Decreased hearing noted. There is impacted cerumen.   Eyes:      Extraocular Movements: Extraocular movements intact.      Conjunctiva/sclera: Conjunctivae normal.      Pupils: Pupils are equal, round, and reactive to light.   Cardiovascular:      Rate and Rhythm: Regular rhythm.      Pulses: Normal pulses.      Heart sounds: Normal heart sounds.   Pulmonary:      Effort: Pulmonary effort is normal. No respiratory distress.      Breath sounds: Normal breath sounds.   Abdominal:      General: Abdomen is flat. There is no distension.   Musculoskeletal:         General: Normal range of motion.      Cervical back: Normal range of motion and neck supple.   Skin:     General: Skin is warm and dry.   Neurological:      General: No focal deficit present.      Mental Status: He is alert and oriented to person, place, and time.   Psychiatric:         Mood and Affect: Mood normal.         Behavior: Behavior normal.         Thought Content: Thought content normal.         ED Course & MDM        Medical Decision Making  Summary:  Medical Decision Making:   Patient presented as described in HPI. Patient case including ROS, PE, and treatment and plan discussed with ED attending if attached as cosigner. Results from labs and or imaging included below if completed. Amor Guzmán  is a 68 y.o. coming in for Patient presents with:  Hearing Problem: Pt presents to the ER with trouble hearing out of his ears, pt states this has been going on for x1 wk. PT states that he has an earache to the left ear at this time, but denies any other complaints.  Patient reports he has had decreased hearing.  He  has a history of cerumen impaction before in the past when he was younger.  He has not had them frequently.  Patient today does appear to have bilateral cerumen impaction.  Hydrogen peroxide and warm water was used as well as a curette to try to remove all of the earwax.  Left ear canal is clearly visible with the TM visible.  no cerumen and concerning abnormalities.  Right ear canal does still have some cerumen.  TM not visualized.  Patient is having pain during removal.  I am concerned that going further back could potentially perforate or cause more problems therefore patient will be advised follow-up with ENT and advised [facility] in the meantime.  Patient is agreeable with plan and is grateful that he can hear the left ear now.  .     Patient was advised to follow up with PCP or recommended provider in 2-3 days for another evaluation and exam. I advised patient/guardian to return or go to closest emergency room immediately if symptoms change, get worse, new symptoms develop prior to follow up. If there is no improvement in symptoms in the next 24 hours they are advised to return for further evaluation and exam. I also explained the plan and treatment course. Patient/guardian is in agreement with plan, treatment course, and follow up and states verbally that they will comply.    Labs Reviewed - No data to display   No orders to display      Tests/Medications/Escalations of Care considered but not given: As in MDM    Patient care discussed with: N/A  Social Determinants affecting care: N/A    Final diagnosis and disposition as documented       Homegoing. I discussed the differential; results and discharge plan with the patient and/or family/friend/caregiver if present.  I emphasized the importance of follow-up with the physician I referred them to in the timeframe recommended.  I explained reasons for the patient to return to the Emergency Department. They agreed that if they feel their condition is worsening or if  they have any other concern they should call 911 immediately for further assistance. I gave the patient an opportunity to ask all questions they had and answered all of them accordingly. They understand return precautions and discharge instructions. The patient and/or family/friend/caregiver expressed understanding verbally and that they would comply.     Disposition: Discharge      This note has been transcribed using voice recognition and may contain grammatical errors, misplaced words, incorrect words, incorrect phrases or other errors.          Procedure  Procedures     Greg Vinson PA-C  10/19/23 2934

## 2023-11-03 ENCOUNTER — OFFICE VISIT (OUTPATIENT)
Dept: OTOLARYNGOLOGY | Facility: CLINIC | Age: 68
End: 2023-11-03

## 2023-11-03 DIAGNOSIS — H91.93 DECREASED HEARING OF BOTH EARS: Primary | ICD-10-CM

## 2023-11-03 DIAGNOSIS — H61.21 IMPACTED CERUMEN OF RIGHT EAR: ICD-10-CM

## 2023-11-03 PROCEDURE — 4010F ACE/ARB THERAPY RXD/TAKEN: CPT | Performed by: OTOLARYNGOLOGY

## 2023-11-03 PROCEDURE — 1159F MED LIST DOCD IN RCRD: CPT | Performed by: OTOLARYNGOLOGY

## 2023-11-03 PROCEDURE — 3046F HEMOGLOBIN A1C LEVEL >9.0%: CPT | Performed by: OTOLARYNGOLOGY

## 2023-11-03 PROCEDURE — 3074F SYST BP LT 130 MM HG: CPT | Performed by: OTOLARYNGOLOGY

## 2023-11-03 PROCEDURE — 3078F DIAST BP <80 MM HG: CPT | Performed by: OTOLARYNGOLOGY

## 2023-11-03 PROCEDURE — 99202 OFFICE O/P NEW SF 15 MIN: CPT | Performed by: OTOLARYNGOLOGY

## 2023-11-03 PROCEDURE — 69210 REMOVE IMPACTED EAR WAX UNI: CPT | Performed by: OTOLARYNGOLOGY

## 2023-11-03 PROCEDURE — 1036F TOBACCO NON-USER: CPT | Performed by: OTOLARYNGOLOGY

## 2023-11-03 PROCEDURE — 1160F RVW MEDS BY RX/DR IN RCRD: CPT | Performed by: OTOLARYNGOLOGY

## 2023-11-03 PROCEDURE — 1125F AMNT PAIN NOTED PAIN PRSNT: CPT | Performed by: OTOLARYNGOLOGY

## 2023-11-03 NOTE — LETTER
November 3, 2023     Greg Vinson PA-C  [address]  [facility] [address]    Patient: Amor Guzmán   YOB: 1955   Date of Visit: 11/3/2023       Dear Dr. Greg Vinson PA-C:    Thank you for referring Amor Guzmán to me for evaluation. Below are my notes for this consultation.  If you have questions, please do not hesitate to call me. I look forward to following your patient along with you.       Sincerely,     Rico Benavides MD      CC: No Recipients  ______________________________________________________________________________________        History Of Present Illness  Amor Guzmán is a 68 y.o. male   He has decreased hearing.    On examination, there was wax build up in right ear canal. Cleaning was done.    Plan:  1- hearing test      Past Medical History  He has a past medical history of Chronic or unspecified duodenal ulcer with hemorrhage (03/12/2021), Encounter for follow-up examination after completed treatment for conditions other than malignant neoplasm (11/01/2021), Encounter for general adult medical examination without abnormal findings (04/15/2022), Encounter for preprocedural cardiovascular examination (09/18/2020), Encounter for screening for malignant neoplasm of colon (01/14/2022), Encounter for screening for other viral diseases (01/05/2021), Enterocolitis due to Clostridium difficile, not specified as recurrent (11/02/2021), Immunization not carried out because of patient refusal (10/16/2020), Immunization not carried out because of patient refusal (10/16/2020), Infection following a procedure, other surgical site, initial encounter (11/12/2021), Infection following a procedure, other surgical site, initial encounter (11/12/2021), Infectious gastroenteritis and colitis, unspecified (03/17/2021), Low back pain, unspecified (09/29/2021), Male erectile dysfunction, unspecified (07/08/2021), Other abnormal glucose (10/16/2020), Other conditions influencing health  status (04/15/2022), Other forms of angina pectoris (07/08/2021), Other specified diseases of gallbladder (09/02/2020), Other specified diseases of gallbladder (10/07/2020), Other specified symptoms and signs involving the circulatory and respiratory systems (09/24/2021), Pain in unspecified hip (09/01/2021), Pain in unspecified thigh (09/01/2021), Personal history of diseases of the blood and blood-forming organs and certain disorders involving the immune mechanism (11/01/2021), Personal history of diseases of the skin and subcutaneous tissue (11/12/2021), Personal history of other diseases of the digestive system (11/02/2021), Personal history of other diseases of the musculoskeletal system and connective tissue (09/29/2021), Personal history of other endocrine, nutritional and metabolic disease (09/21/2021), Personal history of other endocrine, nutritional and metabolic disease (11/01/2021), Personal history of other infectious and parasitic diseases (11/12/2021), Personal history of other malignant neoplasm of large intestine (09/16/2021), Personal history of other malignant neoplasm of large intestine, Personal history of other specified conditions (06/24/2021), Personal history of other specified conditions (03/17/2021), Personal history of other specified conditions (09/21/2021), Personal history of peptic ulcer disease, Presence of aortocoronary bypass graft (11/17/2021), Presence of aortocoronary bypass graft (11/22/2021), and Unspecified systolic (congestive) heart failure (CMS/HCC) (09/24/2021).    Surgical History  He has a past surgical history that includes Other surgical history (10/16/2020); Other surgical history (10/16/2020); Other surgical history (11/01/2021); MR angio neck wo IV contrast (8/26/2022); MR angio head wo IV contrast (8/26/2022); CT angio neck w and wo IV contrast (8/26/2022); and CT angio head w and wo IV contrast (8/26/2022).     Social History  He reports that he quit smoking  about 20 years ago. His smoking use included cigarettes. He smoked an average of .25 packs per day. He has never used smokeless tobacco. He reports that he does not currently use alcohol. He reports that he does not use drugs.    Family History  Family History   Problem Relation Name Age of Onset   • Other (cardiac disorder) Mother     • Hypertension Mother     • Lung cancer Mother     • Other (cardiac disorder) Father     • Hypertension Father     • Prostate cancer Father          Allergies  Patient has no known allergies.    Review of Systems        Physical Exam    General appearance: Healthy-appearing, well-nourished, well groomed, in no acute distress.     Head and Face: Atraumatic with no masses, lesions, or scarring.      Salivary glands: No tenderness of the parotid glands or parotid masses.     No tenderness of the submandibular glands or submandibular masses.      Facial strength: Normal strength and symmetry, no synkinesis or facial tic.     Eyes: Conjunctivas look non-hyperemic bilaterally    Ears: Lots of wax was cleaned from right ear canal. Bilaterally ear canals look normal. Tympanic membranes look intact, no hyperemia, fluid or retraction. Hearing grossly normal.      Nose: Dried yellowish secretions at right.    Oral Cavity/Mouth: Lips and tongue look normal.     Throat: No postnasal discharge. No tonsil hypertrophy. No hyperemia.    Neck: Symmetrical, trachea midline.     Pulmonary: Normal respiratory effort.     Lymphatic: No palpable pathologic lymph nodes at neck.     Neurological/Psychiatric Orientation to person, place, and time: Normal.     Mood and affect: Normal.      Extremities: No clubbing.     Skin: No significant skin lesions were noted at face or neck        Procedure  EAR WAX REMOVAL 11.03.2023  Patient had right ear wax. Using small instrument(s) and/or suction cleaning was done. Patient tolerated the procedure well.        Last Recorded Vitals  There were no vitals taken for this  visit.    Relevant Results  Prior to Admission medications    Medication Sig Start Date End Date Taking? Authorizing Provider   acetaminophen (Tylenol) 325 mg tablet Take 2 tablets (650 mg) by mouth every 6 hours if needed. 10/25/21   Historical Provider, MD   ammonium lactate (Lac-Hydrin) 12 % lotion Apply to affected area as needed for dry skin. 2/14/23   Historical Provider, MD   atorvastatin (Lipitor) 80 mg tablet Take 1 tablet (80 mg) by mouth once daily at bedtime. 10/18/23   CECIL Carrera   cetirizine (ZyrTEC) 10 mg tablet Take 1 tablet (10 mg) by mouth once daily. 5/23/23   Historical Provider, MD   clopidogrel (Plavix) 75 mg tablet Take 1 tablet (75 mg) by mouth once daily. 10/18/23 10/17/24  CECIL Carrera   clotrimazole-betamethasone (Lotrisone) cream Apply 1 Application topically 2 times a day. 3/31/23   Historical Provider, MD   Eliquis 5 mg tablet Take 1 tablet (5 mg) by mouth 2 times a day. 10/18/23 10/17/24  CECIL Carrera   empagliflozin (JARDIANCE ORAL) Take by mouth once daily.    Historical Provider, MD   isosorbide mononitrate ER (Imdur) 120 mg 24 hr tablet Take 1 tablet (120 mg) by mouth once daily at bedtime. 10/18/23 10/17/24  CECIL Carrera   lisinopril 10 mg tablet Take 1 tablet (10 mg) by mouth once daily. 10/18/23 10/17/24  CECIL Carrera   melatonin 10 mg tablet Take by mouth. 1 qhs prn 10/25/21   Historical Provider, MD   metoprolol tartrate (Lopressor) 25 mg tablet Take 1 tablet (25 mg) by mouth 2 times a day. 10/18/23 10/17/24  CECIL Carrera   multivitamin with minerals (MULTIPLE VITAMIN-MINERALS ORAL) Take 1 tablet by mouth once daily.    Historical Provider, MD   ondansetron ODT (Zofran-ODT) 8 mg disintegrating tablet Take 1 tablet (8 mg) by mouth every 8 hours if needed for vomiting or nausea.    Historical Provider, MD   ranolazine (Ranexa) 500 mg 12 hr tablet Take 1 tablet (500 mg) by mouth  every 12 hours.    Historical Provider, MD   tamsulosin (Flomax) 0.4 mg 24 hr capsule Take 1 capsule (0.4 mg) by mouth once daily at bedtime. 4/27/21   Historical Provider, MD   triamcinolone (Kenalog) 0.1 % cream 2 times a day. 2/14/23   Historical Provider, MD     No results found.      Assessment/Plan  Amor Guzmán is a 68 y.o. male   He has decreased hearing.    On examination, there was wax build up in right ear canal. Cleaning was done.    Plan:  1- hearing test         Rico Benavides MD  Otolaryngology - Head & Neck Surgery

## 2023-11-03 NOTE — PROGRESS NOTES
History Of Present Illness  Amor Guzmán is a 68 y.o. male   He has decreased hearing.    On examination, there was wax build up in right ear canal. Cleaning was done.    Plan:  1- hearing test      Past Medical History  He has a past medical history of Chronic or unspecified duodenal ulcer with hemorrhage (03/12/2021), Encounter for follow-up examination after completed treatment for conditions other than malignant neoplasm (11/01/2021), Encounter for general adult medical examination without abnormal findings (04/15/2022), Encounter for preprocedural cardiovascular examination (09/18/2020), Encounter for screening for malignant neoplasm of colon (01/14/2022), Encounter for screening for other viral diseases (01/05/2021), Enterocolitis due to Clostridium difficile, not specified as recurrent (11/02/2021), Immunization not carried out because of patient refusal (10/16/2020), Immunization not carried out because of patient refusal (10/16/2020), Infection following a procedure, other surgical site, initial encounter (11/12/2021), Infection following a procedure, other surgical site, initial encounter (11/12/2021), Infectious gastroenteritis and colitis, unspecified (03/17/2021), Low back pain, unspecified (09/29/2021), Male erectile dysfunction, unspecified (07/08/2021), Other abnormal glucose (10/16/2020), Other conditions influencing health status (04/15/2022), Other forms of angina pectoris (07/08/2021), Other specified diseases of gallbladder (09/02/2020), Other specified diseases of gallbladder (10/07/2020), Other specified symptoms and signs involving the circulatory and respiratory systems (09/24/2021), Pain in unspecified hip (09/01/2021), Pain in unspecified thigh (09/01/2021), Personal history of diseases of the blood and blood-forming organs and certain disorders involving the immune mechanism (11/01/2021), Personal history of diseases of the skin and subcutaneous tissue (11/12/2021), Personal history  of other diseases of the digestive system (11/02/2021), Personal history of other diseases of the musculoskeletal system and connective tissue (09/29/2021), Personal history of other endocrine, nutritional and metabolic disease (09/21/2021), Personal history of other endocrine, nutritional and metabolic disease (11/01/2021), Personal history of other infectious and parasitic diseases (11/12/2021), Personal history of other malignant neoplasm of large intestine (09/16/2021), Personal history of other malignant neoplasm of large intestine, Personal history of other specified conditions (06/24/2021), Personal history of other specified conditions (03/17/2021), Personal history of other specified conditions (09/21/2021), Personal history of peptic ulcer disease, Presence of aortocoronary bypass graft (11/17/2021), Presence of aortocoronary bypass graft (11/22/2021), and Unspecified systolic (congestive) heart failure (CMS/HCC) (09/24/2021).    Surgical History  He has a past surgical history that includes Other surgical history (10/16/2020); Other surgical history (10/16/2020); Other surgical history (11/01/2021); MR angio neck wo IV contrast (8/26/2022); MR angio head wo IV contrast (8/26/2022); CT angio neck w and wo IV contrast (8/26/2022); and CT angio head w and wo IV contrast (8/26/2022).     Social History  He reports that he quit smoking about 20 years ago. His smoking use included cigarettes. He smoked an average of .25 packs per day. He has never used smokeless tobacco. He reports that he does not currently use alcohol. He reports that he does not use drugs.    Family History  Family History   Problem Relation Name Age of Onset    Other (cardiac disorder) Mother      Hypertension Mother      Lung cancer Mother      Other (cardiac disorder) Father      Hypertension Father      Prostate cancer Father          Allergies  Patient has no known allergies.    Review of Systems        Physical Exam    General  appearance: Healthy-appearing, well-nourished, well groomed, in no acute distress.     Head and Face: Atraumatic with no masses, lesions, or scarring.      Salivary glands: No tenderness of the parotid glands or parotid masses.     No tenderness of the submandibular glands or submandibular masses.      Facial strength: Normal strength and symmetry, no synkinesis or facial tic.     Eyes: Conjunctivas look non-hyperemic bilaterally    Ears: Lots of wax was cleaned from right ear canal. Bilaterally ear canals look normal. Tympanic membranes look intact, no hyperemia, fluid or retraction. Hearing grossly normal.      Nose: Dried yellowish secretions at right.    Oral Cavity/Mouth: Lips and tongue look normal.     Throat: No postnasal discharge. No tonsil hypertrophy. No hyperemia.    Neck: Symmetrical, trachea midline.     Pulmonary: Normal respiratory effort.     Lymphatic: No palpable pathologic lymph nodes at neck.     Neurological/Psychiatric Orientation to person, place, and time: Normal.     Mood and affect: Normal.      Extremities: No clubbing.     Skin: No significant skin lesions were noted at face or neck        Procedure  EAR WAX REMOVAL 11.03.2023  Patient had right ear wax. Using small instrument(s) and/or suction cleaning was done. Patient tolerated the procedure well.        Last Recorded Vitals  There were no vitals taken for this visit.    Relevant Results  Prior to Admission medications    Medication Sig Start Date End Date Taking? Authorizing Provider   acetaminophen (Tylenol) 325 mg tablet Take 2 tablets (650 mg) by mouth every 6 hours if needed. 10/25/21   Historical Provider, MD   ammonium lactate (Lac-Hydrin) 12 % lotion Apply to affected area as needed for dry skin. 2/14/23   Historical Provider, MD   atorvastatin (Lipitor) 80 mg tablet Take 1 tablet (80 mg) by mouth once daily at bedtime. 10/18/23   Meghna Tolentino, APRN-CNP   cetirizine (ZyrTEC) 10 mg tablet Take 1 tablet (10 mg) by mouth  once daily. 5/23/23   Historical Provider, MD   clopidogrel (Plavix) 75 mg tablet Take 1 tablet (75 mg) by mouth once daily. 10/18/23 10/17/24  CECIL Carrera   clotrimazole-betamethasone (Lotrisone) cream Apply 1 Application topically 2 times a day. 3/31/23   Historical Provider, MD   Eliquis 5 mg tablet Take 1 tablet (5 mg) by mouth 2 times a day. 10/18/23 10/17/24  CECIL Carrera   empagliflozin (JARDIANCE ORAL) Take by mouth once daily.    Historical Provider, MD   isosorbide mononitrate ER (Imdur) 120 mg 24 hr tablet Take 1 tablet (120 mg) by mouth once daily at bedtime. 10/18/23 10/17/24  CECIL Carrera   lisinopril 10 mg tablet Take 1 tablet (10 mg) by mouth once daily. 10/18/23 10/17/24  CECIL Carrera   melatonin 10 mg tablet Take by mouth. 1 qhs prn 10/25/21   Historical Provider, MD   metoprolol tartrate (Lopressor) 25 mg tablet Take 1 tablet (25 mg) by mouth 2 times a day. 10/18/23 10/17/24  CECIL Carrera   multivitamin with minerals (MULTIPLE VITAMIN-MINERALS ORAL) Take 1 tablet by mouth once daily.    Historical Provider, MD   ondansetron ODT (Zofran-ODT) 8 mg disintegrating tablet Take 1 tablet (8 mg) by mouth every 8 hours if needed for vomiting or nausea.    Historical Provider, MD   ranolazine (Ranexa) 500 mg 12 hr tablet Take 1 tablet (500 mg) by mouth every 12 hours.    Historical Provider, MD   tamsulosin (Flomax) 0.4 mg 24 hr capsule Take 1 capsule (0.4 mg) by mouth once daily at bedtime. 4/27/21   Historical Provider, MD   triamcinolone (Kenalog) 0.1 % cream 2 times a day. 2/14/23   Historical Provider, MD     No results found.      Assessment/Plan   Amor Guzmán is a 68 y.o. male   He has decreased hearing.    On examination, there was wax build up in right ear canal. Cleaning was done.    Plan:  1- hearing test         Rico Benavides MD  Otolaryngology - Head & Neck Surgery

## 2023-11-03 NOTE — LETTER
Dear Greg,  Thank you for letting me participate in the care of your patient.  My evaluation is as follows:    _________________________________________________________________    History Of Present Illness  Aomr Guzmán is a 68 y.o. male   He has decreased hearing. He is kindly referred by Greg Vinson PA-C from ER.  On examination, there was a lot of wax build up in right ear canal. Cleaning was done.    Plan:  1- hearing test      Past Medical History  He has a past medical history of Chronic or unspecified duodenal ulcer with hemorrhage (03/12/2021), Encounter for follow-up examination after completed treatment for conditions other than malignant neoplasm (11/01/2021), Encounter for general adult medical examination without abnormal findings (04/15/2022), Encounter for preprocedural cardiovascular examination (09/18/2020), Encounter for screening for malignant neoplasm of colon (01/14/2022), Encounter for screening for other viral diseases (01/05/2021), Enterocolitis due to Clostridium difficile, not specified as recurrent (11/02/2021), Immunization not carried out because of patient refusal (10/16/2020), Immunization not carried out because of patient refusal (10/16/2020), Infection following a procedure, other surgical site, initial encounter (11/12/2021), Infection following a procedure, other surgical site, initial encounter (11/12/2021), Infectious gastroenteritis and colitis, unspecified (03/17/2021), Low back pain, unspecified (09/29/2021), Male erectile dysfunction, unspecified (07/08/2021), Other abnormal glucose (10/16/2020), Other conditions influencing health status (04/15/2022), Other forms of angina pectoris (07/08/2021), Other specified diseases of gallbladder (09/02/2020), Other specified diseases of gallbladder (10/07/2020), Other specified symptoms and signs involving the circulatory and respiratory systems (09/24/2021), Pain in unspecified hip (09/01/2021), Pain in unspecified thigh  (09/01/2021), Personal history of diseases of the blood and blood-forming organs and certain disorders involving the immune mechanism (11/01/2021), Personal history of diseases of the skin and subcutaneous tissue (11/12/2021), Personal history of other diseases of the digestive system (11/02/2021), Personal history of other diseases of the musculoskeletal system and connective tissue (09/29/2021), Personal history of other endocrine, nutritional and metabolic disease (09/21/2021), Personal history of other endocrine, nutritional and metabolic disease (11/01/2021), Personal history of other infectious and parasitic diseases (11/12/2021), Personal history of other malignant neoplasm of large intestine (09/16/2021), Personal history of other malignant neoplasm of large intestine, Personal history of other specified conditions (06/24/2021), Personal history of other specified conditions (03/17/2021), Personal history of other specified conditions (09/21/2021), Personal history of peptic ulcer disease, Presence of aortocoronary bypass graft (11/17/2021), Presence of aortocoronary bypass graft (11/22/2021), and Unspecified systolic (congestive) heart failure (CMS/HCC) (09/24/2021).    Surgical History  He has a past surgical history that includes Other surgical history (10/16/2020); Other surgical history (10/16/2020); Other surgical history (11/01/2021); MR angio neck wo IV contrast (8/26/2022); MR angio head wo IV contrast (8/26/2022); CT angio neck w and wo IV contrast (8/26/2022); and CT angio head w and wo IV contrast (8/26/2022).     Social History  He reports that he quit smoking about 20 years ago. His smoking use included cigarettes. He smoked an average of .25 packs per day. He has never used smokeless tobacco. He reports that he does not currently use alcohol. He reports that he does not use drugs.    Family History  Family History   Problem Relation Name Age of Onset    Other (cardiac disorder) Mother       Hypertension Mother      Lung cancer Mother      Other (cardiac disorder) Father      Hypertension Father      Prostate cancer Father          Allergies  Patient has no known allergies.    Review of Systems        Physical Exam    General appearance: Healthy-appearing, well-nourished, well groomed, in no acute distress.     Head and Face: Atraumatic with no masses, lesions, or scarring.      Salivary glands: No tenderness of the parotid glands or parotid masses.     No tenderness of the submandibular glands or submandibular masses.      Facial strength: Normal strength and symmetry, no synkinesis or facial tic.     Eyes: Conjunctivas look non-hyperemic bilaterally    Ears: Lots of wax was cleaned from right ear canal. Bilaterally ear canals look normal. Tympanic membranes look intact, no hyperemia, fluid or retraction. Hearing grossly normal.      Nose: Dried yellowish secretions at right.    Oral Cavity/Mouth: Lips and tongue look normal.     Throat: No postnasal discharge. No tonsil hypertrophy. No hyperemia.    Neck: Symmetrical, trachea midline.     Pulmonary: Normal respiratory effort.     Lymphatic: No palpable pathologic lymph nodes at neck.     Neurological/Psychiatric Orientation to person, place, and time: Normal.     Mood and affect: Normal.      Extremities: No clubbing.     Skin: No significant skin lesions were noted at face or neck        Procedure  EAR WAX REMOVAL 11.03.2023  Patient had right ear wax. Using small instrument(s) and/or suction cleaning was done. Patient tolerated the procedure well.        Last Recorded Vitals  There were no vitals taken for this visit.    Relevant Results  Prior to Admission medications    Medication Sig Start Date End Date Taking? Authorizing Provider   acetaminophen (Tylenol) 325 mg tablet Take 2 tablets (650 mg) by mouth every 6 hours if needed. 10/25/21   Historical Provider, MD   ammonium lactate (Lac-Hydrin) 12 % lotion Apply to affected area as needed for dry  skin. 2/14/23   Historical Provider, MD   atorvastatin (Lipitor) 80 mg tablet Take 1 tablet (80 mg) by mouth once daily at bedtime. 10/18/23   CECIL Carrera   cetirizine (ZyrTEC) 10 mg tablet Take 1 tablet (10 mg) by mouth once daily. 5/23/23   Historical Provider, MD   clopidogrel (Plavix) 75 mg tablet Take 1 tablet (75 mg) by mouth once daily. 10/18/23 10/17/24  CECIL Carrera   clotrimazole-betamethasone (Lotrisone) cream Apply 1 Application topically 2 times a day. 3/31/23   Historical Provider, MD   Eliquis 5 mg tablet Take 1 tablet (5 mg) by mouth 2 times a day. 10/18/23 10/17/24  CECIL Carrera   empagliflozin (JARDIANCE ORAL) Take by mouth once daily.    Historical Provider, MD   isosorbide mononitrate ER (Imdur) 120 mg 24 hr tablet Take 1 tablet (120 mg) by mouth once daily at bedtime. 10/18/23 10/17/24  CECIL Carrera   lisinopril 10 mg tablet Take 1 tablet (10 mg) by mouth once daily. 10/18/23 10/17/24  CECIL Carrera   melatonin 10 mg tablet Take by mouth. 1 qhs prn 10/25/21   Historical Provider, MD   metoprolol tartrate (Lopressor) 25 mg tablet Take 1 tablet (25 mg) by mouth 2 times a day. 10/18/23 10/17/24  CECIL Carrera   multivitamin with minerals (MULTIPLE VITAMIN-MINERALS ORAL) Take 1 tablet by mouth once daily.    Historical Provider, MD   ondansetron ODT (Zofran-ODT) 8 mg disintegrating tablet Take 1 tablet (8 mg) by mouth every 8 hours if needed for vomiting or nausea.    Historical Provider, MD   ranolazine (Ranexa) 500 mg 12 hr tablet Take 1 tablet (500 mg) by mouth every 12 hours.    Historical Provider, MD   tamsulosin (Flomax) 0.4 mg 24 hr capsule Take 1 capsule (0.4 mg) by mouth once daily at bedtime. 4/27/21   Historical Provider, MD   triamcinolone (Kenalog) 0.1 % cream 2 times a day. 2/14/23   Historical Provider, MD     No results found.      Assessment/Plan   Amor Guzmán is a 68 y.o. male   He  has decreased hearing.    On examination, there was a lot of wax build up in right ear canal. Cleaning was done.    Plan:  1- hearing test         Rico Benavides MD  Otolaryngology - Head & Neck Surgery

## 2023-11-08 ENCOUNTER — OFFICE VISIT (OUTPATIENT)
Dept: UROLOGY | Facility: CLINIC | Age: 68
End: 2023-11-08
Payer: MEDICARE

## 2023-11-08 DIAGNOSIS — N40.1 BENIGN PROSTATIC HYPERPLASIA WITH LOWER URINARY TRACT SYMPTOMS, SYMPTOM DETAILS UNSPECIFIED: Primary | ICD-10-CM

## 2023-11-08 PROCEDURE — 1036F TOBACCO NON-USER: CPT | Performed by: STUDENT IN AN ORGANIZED HEALTH CARE EDUCATION/TRAINING PROGRAM

## 2023-11-08 PROCEDURE — 4010F ACE/ARB THERAPY RXD/TAKEN: CPT | Performed by: STUDENT IN AN ORGANIZED HEALTH CARE EDUCATION/TRAINING PROGRAM

## 2023-11-08 PROCEDURE — 3078F DIAST BP <80 MM HG: CPT | Performed by: STUDENT IN AN ORGANIZED HEALTH CARE EDUCATION/TRAINING PROGRAM

## 2023-11-08 PROCEDURE — 1159F MED LIST DOCD IN RCRD: CPT | Performed by: STUDENT IN AN ORGANIZED HEALTH CARE EDUCATION/TRAINING PROGRAM

## 2023-11-08 PROCEDURE — 3074F SYST BP LT 130 MM HG: CPT | Performed by: STUDENT IN AN ORGANIZED HEALTH CARE EDUCATION/TRAINING PROGRAM

## 2023-11-08 PROCEDURE — 1160F RVW MEDS BY RX/DR IN RCRD: CPT | Performed by: STUDENT IN AN ORGANIZED HEALTH CARE EDUCATION/TRAINING PROGRAM

## 2023-11-08 PROCEDURE — 1125F AMNT PAIN NOTED PAIN PRSNT: CPT | Performed by: STUDENT IN AN ORGANIZED HEALTH CARE EDUCATION/TRAINING PROGRAM

## 2023-11-08 PROCEDURE — 99213 OFFICE O/P EST LOW 20 MIN: CPT | Performed by: STUDENT IN AN ORGANIZED HEALTH CARE EDUCATION/TRAINING PROGRAM

## 2023-11-08 PROCEDURE — 3046F HEMOGLOBIN A1C LEVEL >9.0%: CPT | Performed by: STUDENT IN AN ORGANIZED HEALTH CARE EDUCATION/TRAINING PROGRAM

## 2023-11-08 RX ORDER — TAMSULOSIN HYDROCHLORIDE 0.4 MG/1
0.4 CAPSULE ORAL NIGHTLY
Qty: 30 CAPSULE | Refills: 11 | Status: SHIPPED | OUTPATIENT
Start: 2023-11-08 | End: 2024-11-07

## 2023-11-08 NOTE — PROGRESS NOTES
Subjective   Patient ID: Amor Guzmán is a 68 y.o. male.    HPI  67 yo male with HTN, DM, CAD, and TIA (8/26/22), presenting for BPH.  He was treated by Grace Deleon.      He is taking tamsulosin 0.4 mg. Doing well overall. Happy with urinary symptoms.     PSH: s/p aortobifemoral bypass in October 2020.      January 2022 PSA 0.30  April 2021 PSA 0.4 and f/t=0.1 and 25%       Review of Systems   All other systems reviewed and are negative.      Objective   Physical Exam  Vitals reviewed.         Assessment/Plan   67 yo male with HTN, DM, CAD, and TIA (8/26/22). He presents for evaluation and management of BPH. On tamsulosin 0.4 mg. He is happy with symptom control.     Plan:   Continue tamsulosin 0.4 mg at bedtime.   FUV 1 year.   Diagnoses and all orders for this visit:  Benign prostatic hyperplasia with lower urinary tract symptoms, symptom details unspecified  -     tamsulosin (Flomax) 0.4 mg 24 hr capsule; Take 1 capsule (0.4 mg) by mouth once daily at bedtime.

## 2023-11-28 ENCOUNTER — TELEPHONE (OUTPATIENT)
Dept: CARDIOLOGY | Facility: HOSPITAL | Age: 68
End: 2023-11-28
Payer: MEDICARE

## 2023-11-29 DIAGNOSIS — I25.10 CORONARY ARTERY DISEASE INVOLVING NATIVE HEART WITHOUT ANGINA PECTORIS, UNSPECIFIED VESSEL OR LESION TYPE: ICD-10-CM

## 2023-11-29 RX ORDER — ISOSORBIDE MONONITRATE 120 MG/1
120 TABLET, EXTENDED RELEASE ORAL NIGHTLY
Qty: 90 TABLET | Refills: 3 | Status: SHIPPED | OUTPATIENT
Start: 2023-11-29 | End: 2024-11-28

## 2023-12-04 ENCOUNTER — CLINICAL SUPPORT (OUTPATIENT)
Dept: AUDIOLOGY | Facility: CLINIC | Age: 68
End: 2023-12-04
Payer: MEDICARE

## 2023-12-04 ENCOUNTER — OFFICE VISIT (OUTPATIENT)
Dept: OTOLARYNGOLOGY | Facility: CLINIC | Age: 68
End: 2023-12-04
Payer: MEDICARE

## 2023-12-04 DIAGNOSIS — H91.93 DECREASED HEARING OF BOTH EARS: Primary | ICD-10-CM

## 2023-12-04 DIAGNOSIS — H90.3 ASYMMETRIC SNHL (SENSORINEURAL HEARING LOSS): Primary | ICD-10-CM

## 2023-12-04 DIAGNOSIS — H90.3 BILATERAL SENSORINEURAL HEARING LOSS: ICD-10-CM

## 2023-12-04 PROCEDURE — 92557 COMPREHENSIVE HEARING TEST: CPT | Performed by: AUDIOLOGIST

## 2023-12-04 PROCEDURE — 1036F TOBACCO NON-USER: CPT | Performed by: OTOLARYNGOLOGY

## 2023-12-04 PROCEDURE — 4010F ACE/ARB THERAPY RXD/TAKEN: CPT | Performed by: OTOLARYNGOLOGY

## 2023-12-04 PROCEDURE — 3046F HEMOGLOBIN A1C LEVEL >9.0%: CPT | Performed by: OTOLARYNGOLOGY

## 2023-12-04 PROCEDURE — 92550 TYMPANOMETRY & REFLEX THRESH: CPT | Performed by: AUDIOLOGIST

## 2023-12-04 PROCEDURE — 1159F MED LIST DOCD IN RCRD: CPT | Performed by: OTOLARYNGOLOGY

## 2023-12-04 PROCEDURE — 99213 OFFICE O/P EST LOW 20 MIN: CPT | Performed by: OTOLARYNGOLOGY

## 2023-12-04 PROCEDURE — 1125F AMNT PAIN NOTED PAIN PRSNT: CPT | Performed by: OTOLARYNGOLOGY

## 2023-12-04 PROCEDURE — 1160F RVW MEDS BY RX/DR IN RCRD: CPT | Performed by: OTOLARYNGOLOGY

## 2023-12-04 ASSESSMENT — ENCOUNTER SYMPTOMS
DEPRESSION: 0
LOSS OF SENSATION IN FEET: 0
OCCASIONAL FEELINGS OF UNSTEADINESS: 0

## 2023-12-04 ASSESSMENT — PAIN - FUNCTIONAL ASSESSMENT: PAIN_FUNCTIONAL_ASSESSMENT: 0-10

## 2023-12-04 ASSESSMENT — PAIN SCALES - GENERAL: PAINLEVEL_OUTOF10: 0 - NO PAIN

## 2023-12-04 NOTE — PROGRESS NOTES
History Of Present Illness    12.04.2023: He had his hearing test today which shows decreased hearing at high frequencies. I think this is a combination of aging and exposure to loud sounds.    Recommendations:  1- Bilateral hearing aid use  _________________________________________________________________    Amor Guzmán is a 68 y.o. male   He has decreased hearing.    On examination, there was wax build up in right ear canal. Cleaning was done.    Plan:  1- hearing test      Past Medical History  He has a past medical history of Chronic or unspecified duodenal ulcer with hemorrhage (03/12/2021), Encounter for follow-up examination after completed treatment for conditions other than malignant neoplasm (11/01/2021), Encounter for general adult medical examination without abnormal findings (04/15/2022), Encounter for preprocedural cardiovascular examination (09/18/2020), Encounter for screening for malignant neoplasm of colon (01/14/2022), Encounter for screening for other viral diseases (01/05/2021), Enterocolitis due to Clostridium difficile, not specified as recurrent (11/02/2021), Immunization not carried out because of patient refusal (10/16/2020), Immunization not carried out because of patient refusal (10/16/2020), Infection following a procedure, other surgical site, initial encounter (11/12/2021), Infection following a procedure, other surgical site, initial encounter (11/12/2021), Infectious gastroenteritis and colitis, unspecified (03/17/2021), Low back pain, unspecified (09/29/2021), Male erectile dysfunction, unspecified (07/08/2021), Other abnormal glucose (10/16/2020), Other conditions influencing health status (04/15/2022), Other forms of angina pectoris (07/08/2021), Other specified diseases of gallbladder (09/02/2020), Other specified diseases of gallbladder (10/07/2020), Other specified symptoms and signs involving the circulatory and respiratory systems (09/24/2021), Pain in unspecified hip  (09/01/2021), Pain in unspecified thigh (09/01/2021), Personal history of diseases of the blood and blood-forming organs and certain disorders involving the immune mechanism (11/01/2021), Personal history of diseases of the skin and subcutaneous tissue (11/12/2021), Personal history of other diseases of the digestive system (11/02/2021), Personal history of other diseases of the musculoskeletal system and connective tissue (09/29/2021), Personal history of other endocrine, nutritional and metabolic disease (09/21/2021), Personal history of other endocrine, nutritional and metabolic disease (11/01/2021), Personal history of other infectious and parasitic diseases (11/12/2021), Personal history of other malignant neoplasm of large intestine (09/16/2021), Personal history of other malignant neoplasm of large intestine, Personal history of other specified conditions (06/24/2021), Personal history of other specified conditions (03/17/2021), Personal history of other specified conditions (09/21/2021), Personal history of peptic ulcer disease, Presence of aortocoronary bypass graft (11/17/2021), Presence of aortocoronary bypass graft (11/22/2021), and Unspecified systolic (congestive) heart failure (CMS/HCC) (09/24/2021).    Surgical History  He has a past surgical history that includes Other surgical history (10/16/2020); Other surgical history (10/16/2020); Other surgical history (11/01/2021); MR angio neck wo IV contrast (8/26/2022); MR angio head wo IV contrast (8/26/2022); CT angio neck (8/26/2022); and CT angio head w and wo IV contrast (8/26/2022).     Social History  He reports that he quit smoking about 20 years ago. His smoking use included cigarettes. He smoked an average of .25 packs per day. He has never used smokeless tobacco. He reports that he does not currently use alcohol. He reports that he does not use drugs.    Family History  Family History   Problem Relation Name Age of Onset    Other (cardiac  disorder) Mother      Hypertension Mother      Lung cancer Mother      Other (cardiac disorder) Father      Hypertension Father      Prostate cancer Father          Allergies  Patient has no known allergies.    Review of Systems        Physical Exam (old exam)    General appearance: Healthy-appearing, well-nourished, well groomed, in no acute distress.     Head and Face: Atraumatic with no masses, lesions, or scarring.      Salivary glands: No tenderness of the parotid glands or parotid masses.     No tenderness of the submandibular glands or submandibular masses.      Facial strength: Normal strength and symmetry, no synkinesis or facial tic.     Eyes: Conjunctivas look non-hyperemic bilaterally    Ears: Lots of wax was cleaned from right ear canal. Bilaterally ear canals look normal. Tympanic membranes look intact, no hyperemia, fluid or retraction. Hearing grossly normal.      Nose: Dried yellowish secretions at right.    Oral Cavity/Mouth: Lips and tongue look normal.     Throat: No postnasal discharge. No tonsil hypertrophy. No hyperemia.    Neck: Symmetrical, trachea midline.     Pulmonary: Normal respiratory effort.     Lymphatic: No palpable pathologic lymph nodes at neck.     Neurological/Psychiatric Orientation to person, place, and time: Normal.     Mood and affect: Normal.      Extremities: No clubbing.     Skin: No significant skin lesions were noted at face or neck        Procedure  EAR WAX REMOVAL 11.03.2023  Patient had right ear wax. Using small instrument(s) and/or suction cleaning was done. Patient tolerated the procedure well.        Last Recorded Vitals  There were no vitals taken for this visit.    Relevant Results  Prior to Admission medications    Medication Sig Start Date End Date Taking? Authorizing Provider   acetaminophen (Tylenol) 325 mg tablet Take 2 tablets (650 mg) by mouth every 6 hours if needed. 10/25/21   Historical Provider, MD   ammonium lactate (Lac-Hydrin) 12 % lotion Apply to  affected area as needed for dry skin. 2/14/23   Historical Provider, MD   atorvastatin (Lipitor) 80 mg tablet Take 1 tablet (80 mg) by mouth once daily at bedtime. 10/18/23   CECIL Carrera   cetirizine (ZyrTEC) 10 mg tablet Take 1 tablet (10 mg) by mouth once daily. 5/23/23   Historical Provider, MD   clopidogrel (Plavix) 75 mg tablet Take 1 tablet (75 mg) by mouth once daily. 10/18/23 10/17/24  CECIL Carrera   clotrimazole-betamethasone (Lotrisone) cream Apply 1 Application topically 2 times a day. 3/31/23   Historical Provider, MD   Eliquis 5 mg tablet Take 1 tablet (5 mg) by mouth 2 times a day. 10/18/23 10/17/24  CECIL Carrera   empagliflozin (JARDIANCE ORAL) Take by mouth once daily.    Historical Provider, MD   isosorbide mononitrate ER (Imdur) 120 mg 24 hr tablet Take 1 tablet (120 mg) by mouth once daily at bedtime. 10/18/23 10/17/24  CECIL Carrera   lisinopril 10 mg tablet Take 1 tablet (10 mg) by mouth once daily. 10/18/23 10/17/24  CECIL Carrera   melatonin 10 mg tablet Take by mouth. 1 qhs prn 10/25/21   Historical Provider, MD   metoprolol tartrate (Lopressor) 25 mg tablet Take 1 tablet (25 mg) by mouth 2 times a day. 10/18/23 10/17/24  CECIL Carrera   multivitamin with minerals (MULTIPLE VITAMIN-MINERALS ORAL) Take 1 tablet by mouth once daily.    Historical Provider, MD   ondansetron ODT (Zofran-ODT) 8 mg disintegrating tablet Take 1 tablet (8 mg) by mouth every 8 hours if needed for vomiting or nausea.    Historical Provider, MD   ranolazine (Ranexa) 500 mg 12 hr tablet Take 1 tablet (500 mg) by mouth every 12 hours.    Historical Provider, MD   tamsulosin (Flomax) 0.4 mg 24 hr capsule Take 1 capsule (0.4 mg) by mouth once daily at bedtime. 4/27/21   Historical Provider, MD   triamcinolone (Kenalog) 0.1 % cream 2 times a day. 2/14/23   Historical Provider, MD     No results found.      Assessment/Plan      12.04.2023: He had his hearing test today which shows decreased hearing at high frequencies. I think this is a combination of aging and exposure to loud sounds.    Recommendations:  1- Bilateral hearing aid use  _________________________________________________________________    Amor GOULD Ramirez is a 68 y.o. male   He has decreased hearing.    On examination, there was wax build up in right ear canal. Cleaning was done.    Plan:  1- hearing test         Rico Benavides MD  Otolaryngology - Head & Neck Surgery

## 2023-12-04 NOTE — PROGRESS NOTES
Amor Zamora, age 68 years, is here today for a hearing evaluation.     Difficulty hearing - no  Tinnitus - no  Excessive noise exposure - yes,  and occupational   Chronic ear infections - no  Ear pain - no  Ear drainage - no  Past ear surgery - no  Vertigo - no  Dizziness - no  Past hearing aid use - no  Family history - no    Appointment time: 2-2:45    Otoscopy revealed clear ear canals with visual inspection of the tympanic membranes bilaterally.    Behavioral hearing evaluation revealed asymmetry with the left ear being worse 8869-6304 Hz:  Right ear - normal hearing sensitivity to mild sensorineural hearing loss 250-2000 Hz precipitously sloping to moderately-severe to severe 4752-5521 Hz  Left ear - normal hearing sensitivity to mild sensorineural hearing loss 250-2000 Hz precipitously sloping to moderately-severe to severe 6776-4443 Hz    Speech reception thresholds obtained at a level consistent with pure tone thresholds bilaterally.    Word discrimination:  Right ear - excellent (96%)  Left ear - fair/good (76-84%)    Tympanometry:  Right ear - Type A, normal middle ear function  Left ear - Type A, normal middle ear function    Ipsilateral acoustic reflexes:  Probe right - present 500-4000 Hz  Probe left - present 500-4000 Hz    Contralateral acoustic reflexes:  Probe right - present 500-4000 Hz  Probe left - present 500-2000 Hz and absent at 4000 Hz    Recommendations:  1) Follow up with Dr Benavides  2) Consider hearing aids ([facility] will [facility].  He would not qualify through Medicaid as he does not meet pure tone average criteria of 31 dB or greater  3) Re-evaluate hearing annually, to monitor, or sooner if a change in hearing is noted

## 2024-03-25 ENCOUNTER — OFFICE VISIT (OUTPATIENT)
Dept: CARDIOLOGY | Facility: HOSPITAL | Age: 69
End: 2024-03-25
Payer: MEDICARE

## 2024-03-25 VITALS
SYSTOLIC BLOOD PRESSURE: 134 MMHG | OXYGEN SATURATION: 98 % | WEIGHT: 158.95 LBS | BODY MASS INDEX: 26.45 KG/M2 | HEART RATE: 77 BPM | DIASTOLIC BLOOD PRESSURE: 72 MMHG

## 2024-03-25 DIAGNOSIS — E78.5 HYPERLIPIDEMIA, UNSPECIFIED HYPERLIPIDEMIA TYPE: ICD-10-CM

## 2024-03-25 DIAGNOSIS — I25.10 CORONARY ARTERY DISEASE INVOLVING NATIVE CORONARY ARTERY OF NATIVE HEART WITHOUT ANGINA PECTORIS: Primary | ICD-10-CM

## 2024-03-25 PROCEDURE — 99213 OFFICE O/P EST LOW 20 MIN: CPT | Performed by: NURSE PRACTITIONER

## 2024-03-25 PROCEDURE — 1036F TOBACCO NON-USER: CPT | Performed by: NURSE PRACTITIONER

## 2024-03-25 PROCEDURE — 4010F ACE/ARB THERAPY RXD/TAKEN: CPT | Performed by: NURSE PRACTITIONER

## 2024-03-25 PROCEDURE — 3078F DIAST BP <80 MM HG: CPT | Performed by: NURSE PRACTITIONER

## 2024-03-25 PROCEDURE — 1159F MED LIST DOCD IN RCRD: CPT | Performed by: NURSE PRACTITIONER

## 2024-03-25 PROCEDURE — 1160F RVW MEDS BY RX/DR IN RCRD: CPT | Performed by: NURSE PRACTITIONER

## 2024-03-25 PROCEDURE — 3075F SYST BP GE 130 - 139MM HG: CPT | Performed by: NURSE PRACTITIONER

## 2024-03-25 ASSESSMENT — ENCOUNTER SYMPTOMS
GASTROINTESTINAL NEGATIVE: 1
DYSPNEA ON EXERTION: 1
MYALGIAS: 1
HEMATOLOGIC/LYMPHATIC NEGATIVE: 1
RESPIRATORY NEGATIVE: 1
EYES NEGATIVE: 1
NEUROLOGICAL NEGATIVE: 1
PSYCHIATRIC NEGATIVE: 1
ENDOCRINE NEGATIVE: 1
DEPRESSION: 0

## 2024-03-25 NOTE — PROGRESS NOTES
Referred by Dr. Francis ref. provider found for Follow-up (Intermittent episodes upon waking up of L sided mouth numbness and last 2 digits of L hand numbness.  Sx began over 1 wk ago per patient.)     History Of Present Illness:    Amor Guzmán is a very pleasant 68 year old gentleman with a history of CAD, PVD, HTN and HLD, he is here for a follow up visit. The patient is seen in collaboration with Dr. Woodruff. Mr. Guzmán states has not been feeling well. About a weeks ago had numbness around the left side of his mouth and fingers in his left hand. By the afternoon symptoms resolved. Symptoms have occurred more in the morning. Complains of hip pain when walking. Denies chest pain or shortness of breath. Occasional tightness in his his neck. He is walking daily and staying active.     Review of Systems   Constitutional: Positive for malaise/fatigue.   HENT: Negative.     Eyes: Negative.    Cardiovascular:  Positive for dyspnea on exertion.   Respiratory: Negative.     Endocrine: Negative.    Hematologic/Lymphatic: Negative.    Skin: Negative.    Musculoskeletal:  Positive for arthritis, muscle weakness and myalgias.   Gastrointestinal: Negative.    Neurological: Negative.    Psychiatric/Behavioral: Negative.        Past Medical History:  He has a past medical history of Chronic or unspecified duodenal ulcer with hemorrhage (03/12/2021), Encounter for follow-up examination after completed treatment for conditions other than malignant neoplasm (11/01/2021), Encounter for general adult medical examination without abnormal findings (04/15/2022), Encounter for preprocedural cardiovascular examination (09/18/2020), Encounter for screening for malignant neoplasm of colon (01/14/2022), Encounter for screening for other viral diseases (01/05/2021), Enterocolitis due to Clostridium difficile, not specified as recurrent (11/02/2021), Immunization not carried out because of patient refusal (10/16/2020), Immunization not  carried out because of patient refusal (10/16/2020), Infection following a procedure, other surgical site, initial encounter (11/12/2021), Infection following a procedure, other surgical site, initial encounter (11/12/2021), Infectious gastroenteritis and colitis, unspecified (03/17/2021), Low back pain, unspecified (09/29/2021), Male erectile dysfunction, unspecified (07/08/2021), Other abnormal glucose (10/16/2020), Other conditions influencing health status (04/15/2022), Other forms of angina pectoris (CMS/HCC) (07/08/2021), Other specified diseases of gallbladder (09/02/2020), Other specified diseases of gallbladder (10/07/2020), Other specified symptoms and signs involving the circulatory and respiratory systems (09/24/2021), Pain in unspecified hip (09/01/2021), Pain in unspecified thigh (09/01/2021), Personal history of diseases of the blood and blood-forming organs and certain disorders involving the immune mechanism (11/01/2021), Personal history of diseases of the skin and subcutaneous tissue (11/12/2021), Personal history of other diseases of the digestive system (11/02/2021), Personal history of other diseases of the musculoskeletal system and connective tissue (09/29/2021), Personal history of other endocrine, nutritional and metabolic disease (09/21/2021), Personal history of other endocrine, nutritional and metabolic disease (11/01/2021), Personal history of other infectious and parasitic diseases (11/12/2021), Personal history of other malignant neoplasm of large intestine (09/16/2021), Personal history of other malignant neoplasm of large intestine, Personal history of other specified conditions (06/24/2021), Personal history of other specified conditions (03/17/2021), Personal history of other specified conditions (09/21/2021), Personal history of peptic ulcer disease, Presence of aortocoronary bypass graft (11/17/2021), Presence of aortocoronary bypass graft (11/22/2021), and Unspecified systolic  (congestive) heart failure (CMS/[facility]) (09/24/2021).    Past Surgical History:  He has a past surgical history that includes Other surgical history (10/16/2020); Other surgical history (10/16/2020); Other surgical history (11/01/2021); MR angio neck wo IV contrast (8/26/2022); MR angio head wo IV contrast (8/26/2022); CT angio neck (8/26/2022); and CT angio head w and wo IV contrast (8/26/2022).      Social History:  He reports that he quit smoking about 21 years ago. His smoking use included cigarettes. He smoked an average of .25 packs per day. He has never used smokeless tobacco. He reports that he does not currently use alcohol. He reports that he does not use drugs.    Family History:  Family History   Problem Relation Name Age of Onset    Other (cardiac disorder) Mother      Hypertension Mother      Lung cancer Mother      Other (cardiac disorder) Father      Hypertension Father      Prostate cancer Father          Allergies:  Patient has no known allergies.    Outpatient Medications:  Current Outpatient Medications   Medication Instructions    acetaminophen (TYLENOL) 650 mg, oral, Every 6 hours PRN    ammonium lactate (Lac-Hydrin) 12 % lotion  Apply to affected area as needed for dry skin.<BR>    atorvastatin (LIPITOR) 80 mg, oral, Nightly    cetirizine (ZYRTEC) 10 mg, oral, Daily    clopidogrel (PLAVIX) 75 mg, oral, Daily    clotrimazole-betamethasone (Lotrisone) cream 1 Application, Topical, 2 times daily    Eliquis 5 mg, oral, 2 times daily    empagliflozin (JARDIANCE ORAL) oral, Daily    isosorbide mononitrate ER (IMDUR) 120 mg, oral, Nightly    lisinopril 10 mg, oral, Daily    melatonin 10 mg tablet oral, 1 qhs prn    metoprolol tartrate (LOPRESSOR) 25 mg, oral, 2 times daily    multivitamin with minerals (MULTIPLE VITAMIN-MINERALS ORAL) 1 tablet, oral, Daily    ranolazine (RANEXA) 500 mg, oral, Every 12 hours    tamsulosin (FLOMAX) 0.4 mg, oral, Nightly        Last Recorded Vitals:  Vitals:    03/25/24  0952   BP: 134/72   Pulse: 77   SpO2: 98%   Weight: 72.1 kg (158 lb 15.2 oz)       Physical Exam:  Physical Exam  Vitals reviewed.   HENT:      Head: Normocephalic.      Nose: Nose normal.   Eyes:      Pupils: Pupils are equal, round, and reactive to light.   Cardiovascular:      Rate and Rhythm: Normal rate and regular rhythm.   Pulmonary:      Effort: Pulmonary effort is normal.      Breath sounds: Normal breath sounds.   Abdominal:      General: Abdomen is flat.      Palpations: Abdomen is soft.   Musculoskeletal:         General: Normal range of motion.      Cervical back: Normal range of motion.   Skin:     General: Skin is warm and dry.   Neurological:      General: No focal deficit present.      Mental Status: He is alert and oriented to person, place, and time.   Psychiatric:         Mood and Affect: Mood normal.            Last Labs:  CBC -  Lab Results   Component Value Date    WBC 7.4 08/17/2023    HGB 14.7 08/17/2023    HCT 45.4 08/17/2023    MCV 87 08/17/2023     08/17/2023       CMP -  Lab Results   Component Value Date    CALCIUM 8.3 (L) 08/18/2023    PHOS 2.6 08/27/2022    PROT 6.6 08/16/2023    ALBUMIN 4.1 08/16/2023    AST 14 08/16/2023    ALT 15 08/16/2023    ALKPHOS 91 08/16/2023    BILITOT 1.1 08/16/2023       LIPID PANEL -   Lab Results   Component Value Date    CHOL CANCELED 05/03/2023    TRIG CANCELED 05/03/2023    HDL CANCELED 05/03/2023    CHHDL CANCELED 05/03/2023    LDLF CANCELED 05/03/2023    VLDL CANCELED 05/03/2023    NHDL CANCELED 05/03/2023       RENAL FUNCTION PANEL -   Lab Results   Component Value Date    GLUCOSE 223 (H) 08/18/2023     (L) 08/18/2023    K 3.7 08/18/2023     08/18/2023    CO2 23 08/18/2023    ANIONGAP 13 08/18/2023    BUN 15 08/18/2023    CREATININE 0.77 08/18/2023    GFRMALE >90 08/18/2023    CALCIUM 8.3 (L) 08/18/2023    PHOS 2.6 08/27/2022    ALBUMIN 4.1 08/16/2023        Lab Results   Component Value Date    BNP 78 08/26/2022    HGBA1C 7.1  (H) 01/05/2024       Last Cardiology Tests:  ECG:    Echo:  Echocardiogram 5/2/2023  1. Left ventricular systolic function is normal with a 55-60% estimated ejection fraction.  2. Abnormal septal motion consistent with post-operative status.  3. Spectral Doppler shows an impaired relaxation pattern of left ventricular diastolic filling.  4. There is mild mitral, tricuspid and pulmonic regurgitation.    Echocardiogram 8/26/2022  1. Left ventricular systolic function is normal with a 55-60% estimated ejection fraction.  2. There is mild mitral and tricuspid regurgitation.  3. There is no evidence of a patent foramen ovale.    Echocardiogram 10/18/2021  1. The left ventricular systolic function is low normal with a 50-55% estimated  ejection fraction.  2. Apical septal segment is abnormal.  3. Abnormal septal motion consistent with post-operative status.  4. The left atrium is mild to moderately dilated.  5. RVSP within normal limits.  6. Small pericardial effusion.    Echocardiogram 10/11/2021  1. The left ventricular systolic function is normal with a 55-60% estimated  ejection fraction.  2. Abnormal septal motion consistent with post-operative status.  3. Spectral Doppler shows an impaired relaxation pattern of left ventricular  diastolic filling.  4. There is eccentric left ventricular hypertrophy.  5. RVSP within normal limits.    Echocardiogram 6/23/2021  1. The left ventricular systolic function is normal with a 55-60% estimated  ejection fraction.  2. Spectral Doppler shows an abnormal pattern of left ventricular diastolic  filling.  3. There is trace-mild mitral, tricuspid and pulmonic regurgitation.      Echocardiogram 10/5/2020   1. The left ventricular systolic function is mildly decreased with a 45%  estimated ejection fraction.  2. Apical septal segment, apical anterior segment, and apex are abnormal.  3. Spectral Doppler shows an impaired relaxation pattern of left ventricular  diastolic filling.  4.  There is mild tricuspid regurgitation.  5. The estimated pulmonary artery pressure is mildly elevated with the RVSP at  39.9 mmHg.    Echocardiogram 7/23/2018   1. The left ventricular systolic function is normal with a 55-60% estimated  ejection fraction.   2. Spectral Doppler shows an impaired relaxation pattern of left ventricular  diastolic filling.  Ejection Fractions:  LVEF 55-60%  Cath:  Heart cath 5/12/2023  . Left main: no significant angiographic disease.  2. LAD: 70% diffuse proximal ISR, 90% mid-ISR.  3. LCx: 100%: proximal ISR.  4. RCA: nondominant, 99% proximal .  5. Grafts: (1) Patent RUBEN to LAD (2) Patent SVG-Ramus (3) patent radial jump graft off SVG to LPDA.  6. LVEDP 13mmHg, no aortic stenosis on LV-Ao gradient.    Left heart cath 10/5/2021  1. Left main: no significant angiographic disease.  2. LAD: 90% diffuse prox-mid ISR.  3. LCx: 95% proximal ISR.  4. RCA: 40% mid-vessel disease.  5. Grafts: (1) LIMA to LAD atretic/occluded (2) SVG to ?ramus patent without  disease.  6. LVEDP 9mmHg, no aortic stenosis on LV-Ao gradient.    Left heart cath 10/4/2020   1. The ramus intermedius showed severe atherosclerotic disease.  2. Severe multivessel CAD with evidence of chronically occluded LIMA and patent  SVG to RI.  3. Evidence of severe mid LAD ISR (99%) with CELY I flow S/P successful IVUS  guided POBA.  4. Given the history of recent severe GIB, angiomax infusion was initiated in  lab, to be continued at low dose for a total of 6 hours post PCI.  5. Future laser atherectomy and PCI of severe LAD ISR vs brachytherapy could be  considered of the patient's GIB is treated    Cardiac cath 4/16/2015   * There is significant single-vessel and branch coronary artery disease  in this right dominant system. The left main is unremarkable. The  ostial to proximal LAD stent has a 70% ostial in-stent restenotic  lesion. The mid LAD has a focal 70% in-stent restenotic lesion. The  more distal LAD has mild  diffuse disease. The large ramus has a 60%  ostial stenosis. The LCx has a 50% mid vessel in-stent restenotic  lesion. The dominant right coronary artery has an elongated 40%  proximal to mid vessel lesion.  * The left subclavian is normal, and the LIMA is a good caliber vessel  if needed for CABG surgery.  * The aortobifemoral graft is widely patent and free of significant  disease.    Cardiac cath 3/17/2011   * Angiographic summary: LM: patent; LCx: patent including widely patent  OM2 stent; LAD: patent proximal stent but high grade stenosis of the  midLAD at site of prior stenting x 2; RCA: patent.  * LVEDP=12mmHg at the end of the case.  * Abdominal aortogram: Patent renal arteries with 50% right RA stenosis;  Patent aorto-bifemoral bypass.  * Cath done via the right radial artery without apparent complication.  * Successful PCI of the mid LAD with high pressure PTCA alone (no  additional stents used) using a 2.75mm quamtum maverick balloon.  Stress Test:  Nuclear stress test 6/23/2021  1. No stress-induced ischemia or infarct.  2. Normal LV size with normal LVEF.  3. Mild septal dyskinesis consistent with prior CABG.       Cardiac Imaging:  Operative report 10/15/2021  1. Standard redo median sternotomy.  2. Standard cannulation.  3. Coronary artery bypass graft x2 with the left internal mammary   artery to the left anterior descending and left radial artery   to the posterolateral circumflex.         Assessment/Plan     Mr. Guzmán is a very pleasant 68 year old gentleman with a history of CVA, HTN, HLD, PVD s/p aortobifemoral bypass, and CAD s/p CABG x3 ( sequential skeletonized left internal mammary artery to diagonal and left anterior descending and endoscopic saphenous vein graft to ramus.), he was admitted 10/2020 with an NSTEMI, with a PTCA to 95% mid-LAD ISR, he had recently been treated for a GIB with clipping and epi for a duodenal ulcer. Several days later he continued to have increased unstable  angina and had a PCI to severe ostial LAD, prox LAD ISR and mid LAD, he was recently admitted (10/3/2021)with chest pain, C (10/5/2021) showed severe LAD and LCx severely obstructed in-stent restenosis. He was transferred to [facility] and had a redo CABG on 10/15/2021, CABG x2 with left internal mammary artery to the left anterior descending and left radial artery to the posterolateral circumflex. He recently had numbness in his left hand and numbness in his face, happens on occasional and improves with activity. Patient was given reassurance.   He continues to stay active working in the yard. Heart rate and blood pressure are well controlled today.    Plan  -call with any questions   -follow up in July   -monitor blood pressure at home   -continue Plavix indefinitely and Eliquis   -continue Atorvastatin, and Metoprolol   -continue Imdur and Ranexa   -continue Lisinopril 10 mg daily      Meghna Ricardo, APRN-CNP

## 2024-05-15 ENCOUNTER — APPOINTMENT (OUTPATIENT)
Dept: CARDIOLOGY | Facility: HOSPITAL | Age: 69
End: 2024-05-15
Payer: MEDICARE

## 2024-07-15 ENCOUNTER — OFFICE VISIT (OUTPATIENT)
Dept: CARDIOLOGY | Facility: HOSPITAL | Age: 69
End: 2024-07-15
Payer: MEDICARE

## 2024-07-15 VITALS
WEIGHT: 158.51 LBS | SYSTOLIC BLOOD PRESSURE: 111 MMHG | OXYGEN SATURATION: 98 % | HEART RATE: 73 BPM | DIASTOLIC BLOOD PRESSURE: 58 MMHG | BODY MASS INDEX: 26.38 KG/M2

## 2024-07-15 DIAGNOSIS — E78.5 HYPERLIPIDEMIA, UNSPECIFIED HYPERLIPIDEMIA TYPE: ICD-10-CM

## 2024-07-15 DIAGNOSIS — I10 HYPERTENSION, UNSPECIFIED TYPE: Primary | ICD-10-CM

## 2024-07-15 DIAGNOSIS — I25.10 CORONARY ARTERY DISEASE INVOLVING NATIVE CORONARY ARTERY OF NATIVE HEART WITHOUT ANGINA PECTORIS: ICD-10-CM

## 2024-07-15 PROCEDURE — 1159F MED LIST DOCD IN RCRD: CPT | Performed by: NURSE PRACTITIONER

## 2024-07-15 PROCEDURE — 99213 OFFICE O/P EST LOW 20 MIN: CPT | Performed by: NURSE PRACTITIONER

## 2024-07-15 PROCEDURE — 4010F ACE/ARB THERAPY RXD/TAKEN: CPT | Performed by: NURSE PRACTITIONER

## 2024-07-15 PROCEDURE — 3078F DIAST BP <80 MM HG: CPT | Performed by: NURSE PRACTITIONER

## 2024-07-15 PROCEDURE — 3074F SYST BP LT 130 MM HG: CPT | Performed by: NURSE PRACTITIONER

## 2024-07-15 PROCEDURE — 1036F TOBACCO NON-USER: CPT | Performed by: NURSE PRACTITIONER

## 2024-07-15 RX ORDER — DICYCLOMINE HYDROCHLORIDE 10 MG/1
10 CAPSULE ORAL DAILY
COMMUNITY

## 2024-07-15 RX ORDER — METFORMIN HYDROCHLORIDE 1000 MG/1
1000 TABLET ORAL
COMMUNITY
Start: 2023-08-18

## 2024-07-15 RX ORDER — INSULIN GLARGINE 100 [IU]/ML
INJECTION, SOLUTION SUBCUTANEOUS NIGHTLY
COMMUNITY
Start: 2024-07-02

## 2024-07-15 ASSESSMENT — ENCOUNTER SYMPTOMS
RESPIRATORY NEGATIVE: 1
PSYCHIATRIC NEGATIVE: 1
GASTROINTESTINAL NEGATIVE: 1
EYES NEGATIVE: 1
DYSPNEA ON EXERTION: 1
ENDOCRINE NEGATIVE: 1
MYALGIAS: 1
NEUROLOGICAL NEGATIVE: 1
HEMATOLOGIC/LYMPHATIC NEGATIVE: 1

## 2024-07-15 NOTE — PROGRESS NOTES
Referred by Dr. Francis ref. provider found for Follow-up (Routine.  States he has not taking his cardiac medications yet today.)     History Of Present Illness:    Amor Guzmán is a very pleasant 69 year old gentleman with a history of CAD, PVD, HTN and HLD, he is here for a follow up visit. The patient is seen in collaboration with Dr. Woodruff. Mr. Guzmán states he has been feeling well. Continues to walk on daily basis. Numbness has resolved. Complains of hip pain. Denies chest pain, shortness of breath or heart palpitations. Has been working in the yard. Complains of abdominal pain.     Review of Systems   Constitutional: Positive for malaise/fatigue.   HENT: Negative.     Eyes: Negative.    Cardiovascular:  Positive for dyspnea on exertion.   Respiratory: Negative.     Endocrine: Negative.    Hematologic/Lymphatic: Negative.    Skin: Negative.    Musculoskeletal:  Positive for arthritis, muscle weakness and myalgias.   Gastrointestinal: Negative.    Neurological: Negative.    Psychiatric/Behavioral: Negative.        Past Medical History:  He has a past medical history of Chronic or unspecified duodenal ulcer with hemorrhage (03/12/2021), Encounter for follow-up examination after completed treatment for conditions other than malignant neoplasm (11/01/2021), Encounter for general adult medical examination without abnormal findings (04/15/2022), Encounter for preprocedural cardiovascular examination (09/18/2020), Encounter for screening for malignant neoplasm of colon (01/14/2022), Encounter for screening for other viral diseases (01/05/2021), Enterocolitis due to Clostridium difficile, not specified as recurrent (11/02/2021), Immunization not carried out because of patient refusal (10/16/2020), Immunization not carried out because of patient refusal (10/16/2020), Infection following a procedure, other surgical site, initial encounter (11/12/2021), Infection following a procedure, other surgical site, initial  encounter (11/12/2021), Infectious gastroenteritis and colitis, unspecified (03/17/2021), Low back pain, unspecified (09/29/2021), Male erectile dysfunction, unspecified (07/08/2021), Other abnormal glucose (10/16/2020), Other conditions influencing health status (04/15/2022), Other forms of angina pectoris (CMS-[facility]) (07/08/2021), Other specified diseases of gallbladder (09/02/2020), Other specified diseases of gallbladder (10/07/2020), Other specified symptoms and signs involving the circulatory and respiratory systems (09/24/2021), Pain in unspecified hip (09/01/2021), Pain in unspecified thigh (09/01/2021), Personal history of diseases of the blood and blood-forming organs and certain disorders involving the immune mechanism (11/01/2021), Personal history of diseases of the skin and subcutaneous tissue (11/12/2021), Personal history of other diseases of the digestive system (11/02/2021), Personal history of other diseases of the musculoskeletal system and connective tissue (09/29/2021), Personal history of other endocrine, nutritional and metabolic disease (09/21/2021), Personal history of other endocrine, nutritional and metabolic disease (11/01/2021), Personal history of other infectious and parasitic diseases (11/12/2021), Personal history of other malignant neoplasm of large intestine (09/16/2021), Personal history of other malignant neoplasm of large intestine, Personal history of other specified conditions (06/24/2021), Personal history of other specified conditions (03/17/2021), Personal history of other specified conditions (09/21/2021), Personal history of peptic ulcer disease, Presence of aortocoronary bypass graft (11/17/2021), Presence of aortocoronary bypass graft (11/22/2021), and Unspecified systolic (congestive) heart failure (Multi) (09/24/2021).    Past Surgical History:  He has a past surgical history that includes Other surgical history (10/16/2020); Other surgical history (10/16/2020); Other  surgical history (11/01/2021); MR angio neck wo IV contrast (8/26/2022); MR angio head wo IV contrast (8/26/2022); CT angio neck (8/26/2022); and CT angio head w and wo IV contrast (8/26/2022).      Social History:  He reports that he quit smoking about 21 years ago. His smoking use included cigarettes. He has never used smokeless tobacco. He reports that he does not currently use alcohol. He reports that he does not use drugs.    Family History:  Family History   Problem Relation Name Age of Onset    Other (cardiac disorder) Mother      Hypertension Mother      Lung cancer Mother      Other (cardiac disorder) Father      Hypertension Father      Prostate cancer Father          Allergies:  Patient has no known allergies.    Outpatient Medications:  Current Outpatient Medications   Medication Instructions    acetaminophen (TYLENOL) 650 mg, oral, Every 6 hours PRN    ammonium lactate (Lac-Hydrin) 12 % lotion  Apply to affected area as needed for dry skin.<BR>    atorvastatin (LIPITOR) 80 mg, oral, Nightly    Basaglar KwikPen U-100 Insulin 100 unit/mL (3 mL) pen subcutaneous, Nightly    cetirizine (ZYRTEC) 10 mg, oral, Daily    clopidogrel (PLAVIX) 75 mg, oral, Daily    clotrimazole-betamethasone (Lotrisone) cream 1 Application, Topical, 2 times daily    dicyclomine (BENTYL) 10 mg, oral, Daily    Eliquis 5 mg, oral, 2 times daily    empagliflozin (JARDIANCE ORAL) oral, Daily    isosorbide mononitrate ER (IMDUR) 120 mg, oral, Nightly    lisinopril 10 mg, oral, Daily    melatonin 10 mg tablet oral, 1 qhs prn    metFORMIN (GLUCOPHAGE) 1,000 mg, oral, 2 times daily (morning and late afternoon)    metoprolol tartrate (LOPRESSOR) 25 mg, oral, 2 times daily    multivitamin with minerals (MULTIPLE VITAMIN-MINERALS ORAL) 1 tablet, oral, Daily    ranolazine (RANEXA) 500 mg, oral, Every 12 hours    tamsulosin (FLOMAX) 0.4 mg, oral, Nightly        Last Recorded Vitals:  Vitals:    07/15/24 0959   BP: 111/58   Pulse: 73   SpO2: 98%    Weight: 71.9 kg (158 lb 8.2 oz)       Physical Exam:  Physical Exam  Vitals reviewed.   HENT:      Head: Normocephalic.      Nose: Nose normal.   Eyes:      Pupils: Pupils are equal, round, and reactive to light.   Cardiovascular:      Rate and Rhythm: Normal rate and regular rhythm.   Pulmonary:      Effort: Pulmonary effort is normal.      Breath sounds: Normal breath sounds.   Abdominal:      General: Abdomen is flat.      Palpations: Abdomen is soft.   Musculoskeletal:         General: Normal range of motion.      Cervical back: Normal range of motion.   Skin:     General: Skin is warm and dry.   Neurological:      General: No focal deficit present.      Mental Status: He is alert and oriented to person, place, and time.   Psychiatric:         Mood and Affect: Mood normal.            Last Labs:  CBC -  Lab Results   Component Value Date    WBC 7.4 08/17/2023    HGB 14.7 08/17/2023    HCT 45.4 08/17/2023    MCV 87 08/17/2023     08/17/2023       CMP -  Lab Results   Component Value Date    CALCIUM 8.3 (L) 08/18/2023    PHOS 2.6 08/27/2022    PROT 6.6 08/16/2023    ALBUMIN 4.1 08/16/2023    AST 14 08/16/2023    ALT 15 08/16/2023    ALKPHOS 91 08/16/2023    BILITOT 1.1 08/16/2023       LIPID PANEL -   Lab Results   Component Value Date    CHOL CANCELED 05/03/2023    TRIG CANCELED 05/03/2023    HDL CANCELED 05/03/2023    CHHDL CANCELED 05/03/2023    LDLF CANCELED 05/03/2023    VLDL CANCELED 05/03/2023    NHDL CANCELED 05/03/2023       RENAL FUNCTION PANEL -   Lab Results   Component Value Date    GLUCOSE 223 (H) 08/18/2023     (L) 08/18/2023    K 3.7 08/18/2023     08/18/2023    CO2 23 08/18/2023    ANIONGAP 13 08/18/2023    BUN 15 08/18/2023    CREATININE 0.77 08/18/2023    GFRMALE >90 08/18/2023    CALCIUM 8.3 (L) 08/18/2023    PHOS 2.6 08/27/2022    ALBUMIN 4.1 08/16/2023        Lab Results   Component Value Date    BNP 78 08/26/2022    HGBA1C 7.1 (H) 01/05/2024       Last Cardiology  Tests:  ECG:    Echo:  Echocardiogram 5/2/2023  1. Left ventricular systolic function is normal with a 55-60% estimated ejection fraction.  2. Abnormal septal motion consistent with post-operative status.  3. Spectral Doppler shows an impaired relaxation pattern of left ventricular diastolic filling.  4. There is mild mitral, tricuspid and pulmonic regurgitation.    Echocardiogram 8/26/2022  1. Left ventricular systolic function is normal with a 55-60% estimated ejection fraction.  2. There is mild mitral and tricuspid regurgitation.  3. There is no evidence of a patent foramen ovale.    Echocardiogram 10/18/2021  1. The left ventricular systolic function is low normal with a 50-55% estimated  ejection fraction.  2. Apical septal segment is abnormal.  3. Abnormal septal motion consistent with post-operative status.  4. The left atrium is mild to moderately dilated.  5. RVSP within normal limits.  6. Small pericardial effusion.    Echocardiogram 10/11/2021  1. The left ventricular systolic function is normal with a 55-60% estimated  ejection fraction.  2. Abnormal septal motion consistent with post-operative status.  3. Spectral Doppler shows an impaired relaxation pattern of left ventricular  diastolic filling.  4. There is eccentric left ventricular hypertrophy.  5. RVSP within normal limits.    Echocardiogram 6/23/2021  1. The left ventricular systolic function is normal with a 55-60% estimated  ejection fraction.  2. Spectral Doppler shows an abnormal pattern of left ventricular diastolic  filling.  3. There is trace-mild mitral, tricuspid and pulmonic regurgitation.      Echocardiogram 10/5/2020   1. The left ventricular systolic function is mildly decreased with a 45%  estimated ejection fraction.  2. Apical septal segment, apical anterior segment, and apex are abnormal.  3. Spectral Doppler shows an impaired relaxation pattern of left ventricular  diastolic filling.  4. There is mild tricuspid  regurgitation.  5. The estimated pulmonary artery pressure is mildly elevated with the RVSP at  39.9 mmHg.    Echocardiogram 7/23/2018   1. The left ventricular systolic function is normal with a 55-60% estimated  ejection fraction.   2. Spectral Doppler shows an impaired relaxation pattern of left ventricular  diastolic filling.  Ejection Fractions:  LVEF 55-60%  Cath:  Heart cath 5/12/2023  . Left main: no significant angiographic disease.  2. LAD: 70% diffuse proximal ISR, 90% mid-ISR.  3. LCx: 100%: proximal ISR.  4. RCA: nondominant, 99% proximal .  5. Grafts: (1) Patent RUBEN to LAD (2) Patent SVG-Ramus (3) patent radial jump graft off SVG to LPDA.  6. LVEDP 13mmHg, no aortic stenosis on LV-Ao gradient.    Left heart cath 10/5/2021  1. Left main: no significant angiographic disease.  2. LAD: 90% diffuse prox-mid ISR.  3. LCx: 95% proximal ISR.  4. RCA: 40% mid-vessel disease.  5. Grafts: (1) LIMA to LAD atretic/occluded (2) SVG to ?ramus patent without  disease.  6. LVEDP 9mmHg, no aortic stenosis on LV-Ao gradient.    Left heart cath 10/4/2020   1. The ramus intermedius showed severe atherosclerotic disease.  2. Severe multivessel CAD with evidence of chronically occluded LIMA and patent  SVG to RI.  3. Evidence of severe mid LAD ISR (99%) with CELY I flow S/P successful IVUS  guided POBA.  4. Given the history of recent severe GIB, angiomax infusion was initiated in  lab, to be continued at low dose for a total of 6 hours post PCI.  5. Future laser atherectomy and PCI of severe LAD ISR vs brachytherapy could be  considered of the patient's GIB is treated    Cardiac cath 4/16/2015   * There is significant single-vessel and branch coronary artery disease  in this right dominant system. The left main is unremarkable. The  ostial to proximal LAD stent has a 70% ostial in-stent restenotic  lesion. The mid LAD has a focal 70% in-stent restenotic lesion. The  more distal LAD has mild diffuse disease. The large  ramus has a 60%  ostial stenosis. The LCx has a 50% mid vessel in-stent restenotic  lesion. The dominant right coronary artery has an elongated 40%  proximal to mid vessel lesion.  * The left subclavian is normal, and the LIMA is a good caliber vessel  if needed for CABG surgery.  * The aortobifemoral graft is widely patent and free of significant  disease.    Cardiac cath 3/17/2011   * Angiographic summary: LM: patent; LCx: patent including widely patent  OM2 stent; LAD: patent proximal stent but high grade stenosis of the  midLAD at site of prior stenting x 2; RCA: patent.  * LVEDP=12mmHg at the end of the case.  * Abdominal aortogram: Patent renal arteries with 50% right RA stenosis;  Patent aorto-bifemoral bypass.  * Cath done via the right radial artery without apparent complication.  * Successful PCI of the mid LAD with high pressure PTCA alone (no  additional stents used) using a 2.75mm quamtum maverick balloon.  Stress Test:  Nuclear stress test 6/23/2021  1. No stress-induced ischemia or infarct.  2. Normal LV size with normal LVEF.  3. Mild septal dyskinesis consistent with prior CABG.       Cardiac Imaging:  Operative report 10/15/2021  1. Standard redo median sternotomy.  2. Standard cannulation.  3. Coronary artery bypass graft x2 with the left internal mammary   artery to the left anterior descending and left radial artery   to the posterolateral circumflex.         Assessment/Plan     Mr. Guzmán is a very pleasant 69 year old gentleman with a history of CVA, HTN, HLD, PVD s/p aortobifemoral bypass, and CAD s/p CABG x3 ( sequential skeletonized left internal mammary artery to diagonal and left anterior descending and endoscopic saphenous vein graft to ramus.), he was admitted 10/2020 with an NSTEMI, with a PTCA to 95% mid-LAD ISR, he had recently been treated for a GIB with clipping and epi for a duodenal ulcer. Several days later he continued to have increased unstable angina and had a PCI to severe  ostial LAD, prox LAD ISR and mid LAD, he was recently admitted (10/3/2021)with chest pain, LHC (10/5/2021) showed severe LAD and LCx severely obstructed in-stent restenosis. He was transferred to [facility] and had a redo CABG on 10/15/2021, CABG x2 with left internal mammary artery to the left anterior descending and left radial artery to the posterolateral circumflex. He continues to do well from a cardiac standpoint. Denies chest pain or shortness of breath. Continues to walk.  He continues to stay active working in the yard. Heart rate and blood pressure are well controlled today.    Plan  -call with any questions   -follow up in May   -monitor blood pressure at home   -continue Plavix indefinitely and Eliquis   -continue Atorvastatin, and Metoprolol   -continue Imdur and Ranexa   -continue Lisinopril 10 mg daily      Meghna Ricardo, APRN-CNP

## 2024-07-22 DIAGNOSIS — I25.10 ATHEROSCLEROTIC HEART DISEASE OF NATIVE CORONARY ARTERY WITHOUT ANGINA PECTORIS: ICD-10-CM

## 2024-07-22 RX ORDER — RANOLAZINE 500 MG/1
500 TABLET, EXTENDED RELEASE ORAL EVERY 12 HOURS
Qty: 180 TABLET | Refills: 3 | Status: SHIPPED | OUTPATIENT
Start: 2024-07-22

## 2024-08-27 DIAGNOSIS — N40.1 BENIGN PROSTATIC HYPERPLASIA WITH LOWER URINARY TRACT SYMPTOMS, SYMPTOM DETAILS UNSPECIFIED: ICD-10-CM

## 2024-08-27 RX ORDER — TAMSULOSIN HYDROCHLORIDE 0.4 MG/1
0.4 CAPSULE ORAL NIGHTLY
Qty: 90 CAPSULE | Refills: 3 | Status: SHIPPED | OUTPATIENT
Start: 2024-08-27

## 2024-09-23 NOTE — PROGRESS NOTES
History Of Present Illness  Amor Guzmán is a 69 y.o. male presenting to GI clinic with a chief complaint of chronic diarrhea. He has a history of, colon ca, IBS, C diff, GERD, duodenal ulcer.  He is here with his wife.    Patient complains of chronic diarrhea. He will have bouts of it every 2-3 months, but by the time he is able to get in to see GI, the problem has resolved.  These episodes last several weeks.  He denies melena, hematochezia, BRBPR.  Patient has associated abdominal pain that doesn't improve with bms.  He has not taken anything for symptoms.      He does report that he normally moves his bowels a lot and often has fecal urgency right after meals, sometimes still will be eating and need to run to the bathroom to move his bowels.  Every once in a while he has nocturnal awakenings for bowel movements.    Social History  He reports that he quit smoking about 21 years ago. His smoking use included cigarettes. He has never used smokeless tobacco. He reports that he does not currently use alcohol. He reports that he does not use drugs.  He does not take NSAIDs on a regular basis    Family History  Family History   Problem Relation Name Age of Onset    Other (cardiac disorder) Mother      Hypertension Mother      Lung cancer Mother      Other (cardiac disorder) Father      Hypertension Father      Prostate cancer Father       The patient does not have a FH of CRC. he does not have a FH of IBD    Review of Systems   Gastrointestinal:  Positive for abdominal pain and diarrhea. Negative for anal bleeding and blood in stool.        See HPI   All other systems reviewed and are negative.        Physical Exam  Constitutional:       Appearance: He is overweight.   HENT:      Head: Normocephalic and atraumatic.   Eyes:      Conjunctiva/sclera: Conjunctivae normal.      Pupils: Pupils are equal, round, and reactive to light.   Pulmonary:      Effort: Pulmonary effort is normal.   Abdominal:      General: Bowel  "sounds are normal. There is no distension.      Palpations: Abdomen is soft. There is no mass.      Tenderness: There is no abdominal tenderness. There is no guarding or rebound.      Hernia: No hernia is present.   Musculoskeletal:         General: Normal range of motion.      Cervical back: Normal range of motion.   Skin:     General: Skin is warm and dry.      Coloration: Skin is not jaundiced.   Neurological:      Mental Status: He is alert and oriented to person, place, and time. Mental status is at baseline.   Psychiatric:         Mood and Affect: Mood normal.         Behavior: Behavior normal.          Last Vital Signs  /64 (BP Location: Right arm, Patient Position: Sitting, BP Cuff Size: Adult)   Pulse 52   Temp 36.5 °C (97.7 °F)   Resp 20   Ht 1.651 m (5' 5\")   Wt 71.4 kg (157 lb 4.8 oz)   SpO2 97%   BMI 26.18 kg/m²      Relevant Results  Lab Results   Component Value Date    WBC 7.4 08/17/2023    HGB 14.7 08/17/2023    HCT 45.4 08/17/2023    MCV 87 08/17/2023     08/17/2023      Lab Results   Component Value Date    GLUCOSE 223 (H) 08/18/2023    CALCIUM 8.3 (L) 08/18/2023     (L) 08/18/2023    K 3.7 08/18/2023    CO2 23 08/18/2023     08/18/2023    BUN 15 08/18/2023    CREATININE 0.77 08/18/2023      Lab Results   Component Value Date    ALT 15 08/16/2023    AST 14 08/16/2023    ALKPHOS 91 08/16/2023    BILITOT 1.1 08/16/2023    BILIDIR 0.1 10/09/2021    INR 1.5 (H) 08/17/2023      Lab Results   Component Value Date    IRON 21 (L) 10/09/2021    TIBC 333 10/09/2021    IRONSAT 6 (L) 10/09/2021    FERRITIN 17 (L) 10/09/2021     No results found for: \"CALPS\", \"CRP\"   Lab Results   Component Value Date    LIPASE 4 (L) 02/16/2023    LIPASE 8 (L) 11/10/2021    LIPASE 5 (L) 02/02/2021    LIPASE 8 (L) 09/29/2020    LIPASE 9 12/13/2018    LIPASE 3 (L) 07/20/2018      Lab Results   Component Value Date    HGBA1C 7.1 (H) 01/05/2024    HGBA1C 10.6 (H) 10/02/2023    HGBA1C 10.9 (A) " 08/17/2023    HGBA1C 11.2 (A) 08/16/2023      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 Ellsworth- Impression:            - Z-line, 37 cm from the incisors.                         - Esophageal mucosal changes suspicious for                          short-segment Rossi's esophagus. Biopsied.                         - Erythematous mucosa in the stomach. Biopsied.                         - Erythematous duodenopathy.    COLONOSCOPY 2/28/2022 with Dr. Verdin- Impression:            - Hemorrhoids found on perianal exam.                         - Patent functional end-to-end colo-colonic                          anastomosis, characterized by healthy appearing mucosa.                         - The examination was otherwise normal.                         - No specimens collected.    EGD 9/29/2020 with Dr. Ruff- Impression:            - Single esophageal ulcer in setting of esophagitis.                         - 4 cm hiatal hernia.                         - Clotted blood in the gastric fundus and in the                          gastric body.                         - Duodenal ulcer with spurting hemorrhage (Eddie                          Class Ia). Injected. Clips (MR iAdvize) were                          placed.                         - Erosive duodenitis with hemorrhage.                         - Normal second portion of the duodenum.                         - No specimens collected.    Colonoscopy 11/18/2019 with Dr. Ruff- Impression:            - Patent end-to-side colo-colonic anastomosis,                          characterized by healthy appearing mucosa and visible                          sutures.                         - Diverticulosis in the sigmoid colon.                         - Non-bleeding internal hemorrhoids.                         - No specimens collected.    IMAGING  CT abdomen/pelvis w IV contrast 8/13/2021- IMPRESSION:  1.  Status post  aortobifemoral bypass which is well opacified and  patent. No abdominal aortic aneurysm.  2. Interval resolution of the previously noted colitis.  3. Diverticulosis of the sigmoid colon but without signs of acute  diverticulitis free    CT abdomen pelvis w IV contrast 2/2/2021- IMPRESSION:  DIFFUSE, IMPRESSIVE PANCOLITIS; FAVOR INFECTIOUS ETIOLOGY. OTHER  ETIOLOGIES NOT EXCLUDED     NO OTHER ACUTE FINDINGS INCLUDING NO PERFORATION, ABSCESS OR  COMPELLING EVIDENCE FOR ACTIVE METASTATIC DISEASE    CT abdomen/pelvis w iv contrast 9/29/2020- IMPRESSION:  Post surgical changes of a recent cholecystectomy. 5 cm x 3.3 cm  heterogeneous fluid collection in the gallbladder fossa with a few gas  locules.  Findings may secondary to a seroma, abscess, hematoma or  possible biloma.     Findings compatible with a gastroenteritis.     Question mild wall thickening of the distal esophagus.  Correlate  clinically for possible esophagitis.  Consider follow-up endoscopy.     Colonic diverticulosis without findings of diverticulitis.     Tiny 2 to 3 mm nodules in the right lung base.  Recommend follow-up  according to the criteria provided below.    CT chest/abdomen/pelvis w IV contrast 8/24/2020- ABDOMEN/PELVIS:  Stable and intact aortobifemoral graft. Stable advanced  atherosclerotic changes in the abdominal aorta and iliac vessels.     There is a mid transverse colon reanastomosis. Moderate diffuse  retained colonic stool. Sigmoid diverticulosis without acute  associated inflammation.     Scant scattered small bilateral renal cysts.     Porcelain gallbladder.    CT abdomen/pelvis w IV contrast 12/9/2019- IMPRESSION:  Status post transverse colon resection with reanastomosis. No CT  evidence of malignancy or metastatic disease.     Porcelain gallbladder. Consider surgical consultation.     Stable tiny nodular densities at the lung bases measuring 1 and 2 mm  likely representing benign etiologies.     Status post Y-shaped aortoiliac  graft with patency.  Assessment & Plan  Irritable bowel syndrome with diarrhea  IBS symptoms may be multifactorial.  Patient is status post colon resection for personal history of colon cancer.  He has personal history of C. difficile and pancolitis.  He is also status post cholecystectomy, may have bile acid diarrhea.  Note chronically low lipase, consider EPI  Stool studies  Trial colestipol 1 g by mouth twice daily  Can take Imodium by mouth 4 times daily as needed diarrhea  Orders:    colestipol (Colestid) 1 gram tablet; Take 1 tablet (1 g) by mouth 2 times a day. Take at least 4 hours before or 1 hour after other medications.    loperamide (Imodium) 2 mg capsule; Take 1 capsule (2 mg) by mouth 4 times a day as needed for diarrhea.    Pancreatic Elastase, Fecal; Future    Calprotectin, Fecal; Future    Follow-up in 1 to 2 months    Radha Webb, APRN-CNP

## 2024-09-24 ENCOUNTER — APPOINTMENT (OUTPATIENT)
Dept: GASTROENTEROLOGY | Facility: CLINIC | Age: 69
End: 2024-09-24
Payer: MEDICARE

## 2024-09-24 VITALS
OXYGEN SATURATION: 97 % | SYSTOLIC BLOOD PRESSURE: 138 MMHG | HEART RATE: 52 BPM | DIASTOLIC BLOOD PRESSURE: 64 MMHG | HEIGHT: 65 IN | RESPIRATION RATE: 20 BRPM | TEMPERATURE: 97.7 F | BODY MASS INDEX: 26.21 KG/M2 | WEIGHT: 157.3 LBS

## 2024-09-24 DIAGNOSIS — K58.0 IRRITABLE BOWEL SYNDROME WITH DIARRHEA: Primary | ICD-10-CM

## 2024-09-24 PROBLEM — J44.9 CHRONIC OBSTRUCTIVE LUNG DISEASE (MULTI): Status: ACTIVE | Noted: 2022-09-16

## 2024-09-24 PROBLEM — C18.9: Status: ACTIVE | Noted: 2022-09-16

## 2024-09-24 PROCEDURE — 1126F AMNT PAIN NOTED NONE PRSNT: CPT

## 2024-09-24 PROCEDURE — 3075F SYST BP GE 130 - 139MM HG: CPT

## 2024-09-24 PROCEDURE — 3078F DIAST BP <80 MM HG: CPT

## 2024-09-24 PROCEDURE — 4010F ACE/ARB THERAPY RXD/TAKEN: CPT

## 2024-09-24 PROCEDURE — 1036F TOBACCO NON-USER: CPT

## 2024-09-24 PROCEDURE — 1159F MED LIST DOCD IN RCRD: CPT

## 2024-09-24 PROCEDURE — 3008F BODY MASS INDEX DOCD: CPT

## 2024-09-24 PROCEDURE — 1160F RVW MEDS BY RX/DR IN RCRD: CPT

## 2024-09-24 PROCEDURE — 99203 OFFICE O/P NEW LOW 30 MIN: CPT

## 2024-09-24 RX ORDER — MONTELUKAST SODIUM 4 MG/1
1 TABLET, CHEWABLE ORAL 2 TIMES DAILY
Qty: 60 TABLET | Refills: 2 | Status: SHIPPED | OUTPATIENT
Start: 2024-09-24 | End: 2024-12-23

## 2024-09-24 RX ORDER — PANTOPRAZOLE SODIUM 40 MG/1
40 TABLET, DELAYED RELEASE ORAL
COMMUNITY
Start: 2024-09-17

## 2024-09-24 RX ORDER — LOPERAMIDE HYDROCHLORIDE 2 MG/1
2 CAPSULE ORAL 4 TIMES DAILY PRN
Qty: 120 CAPSULE | Refills: 2 | Status: SHIPPED | OUTPATIENT
Start: 2024-09-24 | End: 2024-12-23

## 2024-09-24 ASSESSMENT — PAIN SCALES - GENERAL: PAINLEVEL: 0-NO PAIN

## 2024-09-24 ASSESSMENT — ENCOUNTER SYMPTOMS
ABDOMINAL PAIN: 1
ANAL BLEEDING: 0
BLOOD IN STOOL: 0
DIARRHEA: 1
ROS GI COMMENTS: SEE HPI

## 2024-09-24 ASSESSMENT — PATIENT HEALTH QUESTIONNAIRE - PHQ9
1. LITTLE INTEREST OR PLEASURE IN DOING THINGS: NOT AT ALL
SUM OF ALL RESPONSES TO PHQ9 QUESTIONS 1 AND 2: 0
2. FEELING DOWN, DEPRESSED OR HOPELESS: NOT AT ALL

## 2024-09-24 NOTE — ASSESSMENT & PLAN NOTE
IBS symptoms may be multifactorial.  Patient is status post colon resection for personal history of colon cancer.  He has personal history of C. difficile and pancolitis.  He is also status post cholecystectomy, may have bile acid diarrhea.  Note chronically low lipase, consider EPI  Stool studies  Trial colestipol 1 g by mouth twice daily  Can take Imodium by mouth 4 times daily as needed diarrhea  Orders:    colestipol (Colestid) 1 gram tablet; Take 1 tablet (1 g) by mouth 2 times a day. Take at least 4 hours before or 1 hour after other medications.    loperamide (Imodium) 2 mg capsule; Take 1 capsule (2 mg) by mouth 4 times a day as needed for diarrhea.    Pancreatic Elastase, Fecal; Future    Calprotectin, Fecal; Future

## 2024-09-30 DIAGNOSIS — I10 ESSENTIAL HYPERTENSION: ICD-10-CM

## 2024-09-30 DIAGNOSIS — I25.10 CORONARY ARTERY DISEASE INVOLVING NATIVE HEART WITHOUT ANGINA PECTORIS, UNSPECIFIED VESSEL OR LESION TYPE: ICD-10-CM

## 2024-09-30 RX ORDER — CLOPIDOGREL BISULFATE 75 MG/1
75 TABLET ORAL DAILY
Qty: 90 TABLET | Refills: 3 | Status: SHIPPED | OUTPATIENT
Start: 2024-09-30

## 2024-09-30 RX ORDER — LISINOPRIL 10 MG/1
10 TABLET ORAL DAILY
Qty: 90 TABLET | Refills: 3 | Status: SHIPPED | OUTPATIENT
Start: 2024-09-30

## 2024-11-04 NOTE — PROGRESS NOTES
Subjective   Patient ID: Amor Guzmán is a 69 y.o. male.    HPI  69 yo male with HTN, DM, CAD, and TIA (8/26/22), presenting for BPH.  He was treated by Grace Deleon.      He is taking tamsulosin 0.4 mg. He does not have any complaints of nocturia. He has some frequency and urgency. He did run out at one point and had a noticeable increase in his symptoms while off of it.      PSH: s/p aortobifemoral bypass in October 2020.     Lab Results   Component Value Date    PSA 0.30 01/14/2022    PSA 0.4 04/19/2021         Review of Systems   All other systems reviewed and are negative.      Objective   Physical Exam  Vitals reviewed.         Assessment/Plan   69 yo male with HTN, DM, CAD, and TIA (8/26/22). He presents for evaluation and management of BPH. On tamsulosin 0.4 mg. He is happy with symptom control.     Plan:   PSA today and in 1 year  Continue tamsulosin 0.4 mg at bedtime. Refilled  FUV 1 year.   Diagnoses and all orders for this visit:  Benign prostatic hyperplasia with lower urinary tract symptoms, symptom details unspecified  -     POCT UA Automated manually resulted        Scribe Attestation  By signing my name below, IRachell Scribe   attest that this documentation has been prepared under the direction and in the presence of Bentley Santiago MD.

## 2024-11-06 ENCOUNTER — APPOINTMENT (OUTPATIENT)
Dept: UROLOGY | Facility: CLINIC | Age: 69
End: 2024-11-06
Payer: MEDICARE

## 2024-11-06 DIAGNOSIS — N40.1 BENIGN PROSTATIC HYPERPLASIA WITH LOWER URINARY TRACT SYMPTOMS, SYMPTOM DETAILS UNSPECIFIED: ICD-10-CM

## 2024-11-06 DIAGNOSIS — Z12.5 ENCOUNTER FOR SCREENING PROSTATE SPECIFIC ANTIGEN (PSA) MEASUREMENT: Primary | ICD-10-CM

## 2024-11-06 LAB
POC APPEARANCE, URINE: CLEAR
POC BILIRUBIN, URINE: NEGATIVE
POC BLOOD, URINE: ABNORMAL
POC COLOR, URINE: YELLOW
POC GLUCOSE, URINE: ABNORMAL MG/DL
POC KETONES, URINE: NEGATIVE MG/DL
POC LEUKOCYTES, URINE: NEGATIVE
POC NITRITE,URINE: POSITIVE
POC PH, URINE: 6.5 PH
POC PROTEIN, URINE: NEGATIVE MG/DL
POC SPECIFIC GRAVITY, URINE: 1.01
POC UROBILINOGEN, URINE: 0.2 EU/DL

## 2024-11-06 PROCEDURE — 81003 URINALYSIS AUTO W/O SCOPE: CPT | Performed by: STUDENT IN AN ORGANIZED HEALTH CARE EDUCATION/TRAINING PROGRAM

## 2024-11-06 PROCEDURE — 4010F ACE/ARB THERAPY RXD/TAKEN: CPT | Performed by: STUDENT IN AN ORGANIZED HEALTH CARE EDUCATION/TRAINING PROGRAM

## 2024-11-06 PROCEDURE — G2211 COMPLEX E/M VISIT ADD ON: HCPCS | Performed by: STUDENT IN AN ORGANIZED HEALTH CARE EDUCATION/TRAINING PROGRAM

## 2024-11-06 PROCEDURE — 99213 OFFICE O/P EST LOW 20 MIN: CPT | Performed by: STUDENT IN AN ORGANIZED HEALTH CARE EDUCATION/TRAINING PROGRAM

## 2024-11-06 RX ORDER — TAMSULOSIN HYDROCHLORIDE 0.4 MG/1
0.4 CAPSULE ORAL NIGHTLY
Qty: 90 CAPSULE | Refills: 3 | OUTPATIENT
Start: 2024-11-06

## 2024-11-12 DIAGNOSIS — E78.5 HYPERLIPIDEMIA, UNSPECIFIED: ICD-10-CM

## 2024-11-12 RX ORDER — ATORVASTATIN CALCIUM 80 MG/1
80 TABLET, FILM COATED ORAL NIGHTLY
Qty: 90 TABLET | Refills: 2 | Status: SHIPPED | OUTPATIENT
Start: 2024-11-12

## 2024-11-27 ENCOUNTER — APPOINTMENT (OUTPATIENT)
Dept: RADIOLOGY | Facility: HOSPITAL | Age: 69
End: 2024-11-27
Payer: MEDICARE

## 2024-11-27 ENCOUNTER — APPOINTMENT (OUTPATIENT)
Dept: CARDIOLOGY | Facility: HOSPITAL | Age: 69
End: 2024-11-27
Payer: MEDICARE

## 2024-11-27 ENCOUNTER — HOSPITAL ENCOUNTER (EMERGENCY)
Facility: HOSPITAL | Age: 69
Discharge: HOME | End: 2024-11-27
Attending: EMERGENCY MEDICINE
Payer: MEDICARE

## 2024-11-27 VITALS
HEART RATE: 52 BPM | TEMPERATURE: 97.3 F | RESPIRATION RATE: 14 BRPM | DIASTOLIC BLOOD PRESSURE: 69 MMHG | WEIGHT: 152 LBS | OXYGEN SATURATION: 99 % | HEIGHT: 65 IN | SYSTOLIC BLOOD PRESSURE: 154 MMHG | BODY MASS INDEX: 25.33 KG/M2

## 2024-11-27 DIAGNOSIS — R51.9 NONINTRACTABLE HEADACHE, UNSPECIFIED CHRONICITY PATTERN, UNSPECIFIED HEADACHE TYPE: Primary | ICD-10-CM

## 2024-11-27 DIAGNOSIS — I15.9 SECONDARY HYPERTENSION: ICD-10-CM

## 2024-11-27 LAB
ALBUMIN SERPL BCP-MCNC: 3.9 G/DL (ref 3.4–5)
ALP SERPL-CCNC: 79 U/L (ref 33–136)
ALT SERPL W P-5'-P-CCNC: 9 U/L (ref 10–52)
ANION GAP SERPL CALC-SCNC: 14 MMOL/L (ref 10–20)
AST SERPL W P-5'-P-CCNC: 15 U/L (ref 9–39)
ATRIAL RATE: 54 BPM
BASOPHILS # BLD AUTO: 0.05 X10*3/UL (ref 0–0.1)
BASOPHILS NFR BLD AUTO: 0.4 %
BILIRUB SERPL-MCNC: 0.8 MG/DL (ref 0–1.2)
BUN SERPL-MCNC: 16 MG/DL (ref 6–23)
CALCIUM SERPL-MCNC: 8.9 MG/DL (ref 8.6–10.3)
CARDIAC TROPONIN I PNL SERPL HS: 14 NG/L (ref 0–20)
CARDIAC TROPONIN I PNL SERPL HS: 14 NG/L (ref 0–20)
CHLORIDE SERPL-SCNC: 105 MMOL/L (ref 98–107)
CO2 SERPL-SCNC: 22 MMOL/L (ref 21–32)
CREAT SERPL-MCNC: 0.94 MG/DL (ref 0.5–1.3)
EGFRCR SERPLBLD CKD-EPI 2021: 88 ML/MIN/1.73M*2
EOSINOPHIL # BLD AUTO: 0.3 X10*3/UL (ref 0–0.7)
EOSINOPHIL NFR BLD AUTO: 2.7 %
ERYTHROCYTE [DISTWIDTH] IN BLOOD BY AUTOMATED COUNT: 13.9 % (ref 11.5–14.5)
FLUAV RNA RESP QL NAA+PROBE: NOT DETECTED
FLUBV RNA RESP QL NAA+PROBE: NOT DETECTED
GLUCOSE SERPL-MCNC: 131 MG/DL (ref 74–99)
HCT VFR BLD AUTO: 50.6 % (ref 41–52)
HGB BLD-MCNC: 16.7 G/DL (ref 13.5–17.5)
IMM GRANULOCYTES # BLD AUTO: 0.05 X10*3/UL (ref 0–0.7)
IMM GRANULOCYTES NFR BLD AUTO: 0.4 % (ref 0–0.9)
LYMPHOCYTES # BLD AUTO: 3.36 X10*3/UL (ref 1.2–4.8)
LYMPHOCYTES NFR BLD AUTO: 29.9 %
MCH RBC QN AUTO: 28.9 PG (ref 26–34)
MCHC RBC AUTO-ENTMCNC: 33 G/DL (ref 32–36)
MCV RBC AUTO: 88 FL (ref 80–100)
MONOCYTES # BLD AUTO: 0.85 X10*3/UL (ref 0.1–1)
MONOCYTES NFR BLD AUTO: 7.6 %
NEUTROPHILS # BLD AUTO: 6.63 X10*3/UL (ref 1.2–7.7)
NEUTROPHILS NFR BLD AUTO: 59 %
NRBC BLD-RTO: 0 /100 WBCS (ref 0–0)
P AXIS: 63 DEGREES
P OFFSET: 187 MS
P ONSET: 133 MS
PLATELET # BLD AUTO: 200 X10*3/UL (ref 150–450)
POTASSIUM SERPL-SCNC: 4.1 MMOL/L (ref 3.5–5.3)
PR INTERVAL: 178 MS
PROT SERPL-MCNC: 6.4 G/DL (ref 6.4–8.2)
Q ONSET: 222 MS
QRS COUNT: 8 BEATS
QRS DURATION: 84 MS
QT INTERVAL: 484 MS
QTC CALCULATION(BAZETT): 458 MS
QTC FREDERICIA: 467 MS
R AXIS: 86 DEGREES
RBC # BLD AUTO: 5.77 X10*6/UL (ref 4.5–5.9)
SARS-COV-2 RNA RESP QL NAA+PROBE: NOT DETECTED
SODIUM SERPL-SCNC: 137 MMOL/L (ref 136–145)
T AXIS: 50 DEGREES
T OFFSET: 464 MS
VENTRICULAR RATE: 54 BPM
WBC # BLD AUTO: 11.2 X10*3/UL (ref 4.4–11.3)

## 2024-11-27 PROCEDURE — 87502 INFLUENZA DNA AMP PROBE: CPT | Performed by: EMERGENCY MEDICINE

## 2024-11-27 PROCEDURE — 84484 ASSAY OF TROPONIN QUANT: CPT | Performed by: STUDENT IN AN ORGANIZED HEALTH CARE EDUCATION/TRAINING PROGRAM

## 2024-11-27 PROCEDURE — 36415 COLL VENOUS BLD VENIPUNCTURE: CPT | Performed by: STUDENT IN AN ORGANIZED HEALTH CARE EDUCATION/TRAINING PROGRAM

## 2024-11-27 PROCEDURE — 80053 COMPREHEN METABOLIC PANEL: CPT | Performed by: STUDENT IN AN ORGANIZED HEALTH CARE EDUCATION/TRAINING PROGRAM

## 2024-11-27 PROCEDURE — 71045 X-RAY EXAM CHEST 1 VIEW: CPT | Performed by: RADIOLOGY

## 2024-11-27 PROCEDURE — 99285 EMERGENCY DEPT VISIT HI MDM: CPT | Mod: 25

## 2024-11-27 PROCEDURE — 70450 CT HEAD/BRAIN W/O DYE: CPT | Performed by: RADIOLOGY

## 2024-11-27 PROCEDURE — 71045 X-RAY EXAM CHEST 1 VIEW: CPT

## 2024-11-27 PROCEDURE — 70450 CT HEAD/BRAIN W/O DYE: CPT

## 2024-11-27 PROCEDURE — 85025 COMPLETE CBC W/AUTO DIFF WBC: CPT | Performed by: STUDENT IN AN ORGANIZED HEALTH CARE EDUCATION/TRAINING PROGRAM

## 2024-11-27 PROCEDURE — 93005 ELECTROCARDIOGRAM TRACING: CPT

## 2024-11-27 ASSESSMENT — PAIN SCALES - GENERAL
PAINLEVEL_OUTOF10: 7
PAINLEVEL_OUTOF10: 8

## 2024-11-27 ASSESSMENT — LIFESTYLE VARIABLES
HAVE PEOPLE ANNOYED YOU BY CRITICIZING YOUR DRINKING: NO
HAVE YOU EVER FELT YOU SHOULD CUT DOWN ON YOUR DRINKING: NO
EVER FELT BAD OR GUILTY ABOUT YOUR DRINKING: NO
EVER HAD A DRINK FIRST THING IN THE MORNING TO STEADY YOUR NERVES TO GET RID OF A HANGOVER: NO
TOTAL SCORE: 0

## 2024-11-27 ASSESSMENT — PAIN - FUNCTIONAL ASSESSMENT: PAIN_FUNCTIONAL_ASSESSMENT: 0-10

## 2024-11-27 ASSESSMENT — COLUMBIA-SUICIDE SEVERITY RATING SCALE - C-SSRS
6. HAVE YOU EVER DONE ANYTHING, STARTED TO DO ANYTHING, OR PREPARED TO DO ANYTHING TO END YOUR LIFE?: NO
2. HAVE YOU ACTUALLY HAD ANY THOUGHTS OF KILLING YOURSELF?: NO
1. IN THE PAST MONTH, HAVE YOU WISHED YOU WERE DEAD OR WISHED YOU COULD GO TO SLEEP AND NOT WAKE UP?: NO

## 2024-11-27 NOTE — ED PROVIDER NOTES
HPI   Chief Complaint   Patient presents with    Hypertension    Headache       69-year-old male here for chief complaint of high blood pressure and headache.  Patient states he checked his blood pressure and it was in the 180s.  He did have a headache.  He checks it often.  He has no blurry vision or double vision.  He has a throbbing headache in the back of his head.  He denies any other symptoms or complaints at this time.  He is on 2 blood pressure medications that he has been taking appropriately.  He denies any recent fevers chills or night sweats.    Past medical history includes duodenal ulcer with hemorrhage diabetes hypertension  Quit smoking 21 years ago denies any alcohol or street drug use              Patient History   Past Medical History:   Diagnosis Date    Chronic or unspecified duodenal ulcer with hemorrhage 03/12/2021    Bleeding duodenal ulcer    Encounter for follow-up examination after completed treatment for conditions other than malignant neoplasm 11/01/2021    Hospital discharge follow-up    Encounter for general adult medical examination without abnormal findings 04/15/2022    Medicare annual wellness visit, initial    Encounter for preprocedural cardiovascular examination 09/18/2020    Preop cardiovascular exam    Encounter for screening for malignant neoplasm of colon 01/14/2022    Encounter for screening colonoscopy    Encounter for screening for other viral diseases 01/05/2021    Need for hepatitis C screening test    Enterocolitis due to Clostridium difficile, not specified as recurrent 11/02/2021    Clostridium difficile diarrhea    Immunization not carried out because of patient refusal 10/16/2020    Pneumococcal vaccination declined    Immunization not carried out because of patient refusal 10/16/2020    Influenza vaccination declined by patient    Infection following a procedure, other surgical site, initial encounter 11/12/2021    Incisional infection    Infection following a  procedure, other surgical site, initial encounter 11/12/2021    Incisional infection    Infectious gastroenteritis and colitis, unspecified 03/17/2021    Colitis - presumed infectious origin    Low back pain, unspecified 09/29/2021    Lumbar pain    Male erectile dysfunction, unspecified 07/08/2021    Vasculogenic erectile dysfunction, unspecified vasculogenic erectile dysfunction type    Other abnormal glucose 10/16/2020    Abnormal blood sugar    Other conditions influencing health status 04/15/2022    DM (diabetes mellitus) type II uncontrolled, periph vascular disorder    Other forms of angina pectoris (CMS-[facility]) 07/08/2021    Angina at rest    Other specified diseases of gallbladder 09/02/2020    Porcelain gallbladder    Other specified diseases of gallbladder 10/07/2020    Porcelain gallbladder    Other specified symptoms and signs involving the circulatory and respiratory systems 09/24/2021    Carotid bruit    Pain in unspecified hip 09/01/2021    Joint pain, hip    Pain in unspecified thigh 09/01/2021    Pain of thigh, unspecified laterality    Personal history of diseases of the blood and blood-forming organs and certain disorders involving the immune mechanism 11/01/2021    History of anemia    Personal history of diseases of the skin and subcutaneous tissue 11/12/2021    History of cellulitis    Personal history of other diseases of the digestive system 11/02/2021    History of duodenal ulcer    Personal history of other diseases of the musculoskeletal system and connective tissue 09/29/2021    History of low back pain    Personal history of other endocrine, nutritional and metabolic disease 09/21/2021    History of hyperglycemia    Personal history of other endocrine, nutritional and metabolic disease 11/01/2021    History of diabetes mellitus    Personal history of other infectious and parasitic diseases 11/12/2021    History of Clostridium difficile colitis    Personal history of other malignant  neoplasm of large intestine 2021    History of colon cancer    Personal history of other malignant neoplasm of large intestine     History of malignant neoplasm of colon    Personal history of other specified conditions 2021    History of urinary hesitancy    Personal history of other specified conditions 2021    History of diarrhea    Personal history of other specified conditions 2021    History of abdominal pain    Personal history of peptic ulcer disease     History of bleeding peptic ulcer    Presence of aortocoronary bypass graft 2021    S/P CABG x 2    Presence of aortocoronary bypass graft 2021    S/P CABG (coronary artery bypass graft)    Unspecified systolic (congestive) heart failure (Multi) 2021    ACC/AHA stage B systolic heart failure due to ischemic cardiomyopathy     Past Surgical History:   Procedure Laterality Date    CT ANGIO NECK  2022    CT NECK ANGIO W AND WO IV CONTRAST 2022 [facility]ICAL [facility]GAC[facility]    CT HEAD ANGIO W AND WO IV CONTRAST  2022    CT HEAD ANGIO W AND WO IV CONTRAST 2022 [facility] CLINICAL LEGACY    MR HEAD ANGIO WO IV CONTRAST  2022    MR HEAD ANGIO WO IV CONTRAST 2022 [facility] CLINICAL LEGACY    MR NECK ANGIO WO IV CONTRAST  2022    MR NECK ANGIO WO IV CONTRAST 2022 [facility] CLINICAL LEGACY    OTHER SURGICAL HISTORY  10/16/2020    Gallbladder surgery    OTHER SURGICAL HISTORY  10/16/2020    Cardiac catheterization    OTHER SURGICAL HISTORY  2021    Coronary artery bypass graft     Family History   Problem Relation Name Age of Onset    Other (cardiac disorder) Mother      Hypertension Mother      Lung cancer Mother      Other (cardiac disorder) Father      Hypertension Father      Prostate cancer Father       Social History     Tobacco Use    Smoking status: Former     Current packs/day: 0.00     Types: Cigarettes     Quit date:      Years since quittin.9    Smokeless tobacco: Never   Vaping Use     Vaping status: Never Used   Substance Use Topics    Alcohol use: Not Currently    Drug use: Never       Physical Exam   ED Triage Vitals [11/27/24 0542]   Temperature Heart Rate Respirations BP   36.3 °C (97.3 °F) 61 18 (!) 190/73      Pulse Ox Temp src Heart Rate Source Patient Position   98 % -- -- --      BP Location FiO2 (%)     -- --       Physical Exam  Vitals and nursing note reviewed.   Constitutional:       Appearance: Normal appearance.   HENT:      Head: Normocephalic and atraumatic.      Nose: Nose normal.      Mouth/Throat:      Mouth: Mucous membranes are moist.   Eyes:      Extraocular Movements: Extraocular movements intact.      Pupils: Pupils are equal, round, and reactive to light.   Cardiovascular:      Rate and Rhythm: Normal rate and regular rhythm.   Pulmonary:      Effort: Pulmonary effort is normal.      Breath sounds: Normal breath sounds.   Abdominal:      General: Abdomen is flat.      Palpations: Abdomen is soft.   Musculoskeletal:         General: Normal range of motion.      Cervical back: Normal range of motion.   Skin:     General: Skin is warm and dry.      Capillary Refill: Capillary refill takes less than 2 seconds.   Neurological:      General: No focal deficit present.      Mental Status: He is alert.   Psychiatric:         Mood and Affect: Mood normal.           ED Course & MDM   Diagnoses as of 11/29/24 0613   Nonintractable headache, unspecified chronicity pattern, unspecified headache type   Secondary hypertension                 No data recorded     [place] Coma Scale Score: 15 (11/27/24 0655 : Carlee Chris RN)                           Medical Decision Making  Medical Decision Making: Patient was worked up EKG interpreted by me shows a heart rate of send 54 sinus bradycardia with PACs normal axis and intervals otherwise.  All lab work is currently unremarkable at this time CT head and chest x-ray also unremarkable.  His blood pressure came down beautifully to 154/69.  He  states he is always bradycardic.  At this time he has a normal troponin as well and can be safely discharged home with very close follow-up.  He does agree with plan of care.  Differential includes hypertensive urgency viral syndrome COVID pneumonia  Considered CTA but patient has no focal neurologic deficits on exam.  [unfilled]     Kathy Oliva D.O.  Emergency Medicine          Procedure  Procedures     Kathy Oliva, DO  11/29/24 0613

## 2024-12-07 DIAGNOSIS — I48.91 ATRIAL FIBRILLATION, UNSPECIFIED TYPE (MULTI): ICD-10-CM

## 2024-12-09 RX ORDER — APIXABAN 5 MG/1
5 TABLET, FILM COATED ORAL 2 TIMES DAILY
Qty: 180 TABLET | Refills: 3 | Status: SHIPPED | OUTPATIENT
Start: 2024-12-09

## 2024-12-13 DIAGNOSIS — I10 ESSENTIAL HYPERTENSION: ICD-10-CM

## 2024-12-13 RX ORDER — METOPROLOL TARTRATE 25 MG/1
25 TABLET, FILM COATED ORAL 2 TIMES DAILY
Qty: 180 TABLET | Refills: 3 | Status: SHIPPED | OUTPATIENT
Start: 2024-12-13

## 2024-12-27 NOTE — PATIENT INSTRUCTIONS
CALL WITH ANY QUESTIONS   NO MEDICATION CHANGES   FOLLOW UP IN MAY    PCP: Dequan Terry APRN - NP    Last appt: 12/23/2024    Future Appointments   Date Time Provider Department Center   12/31/2024  8:[facility]   2/5/2025  3:30 PM Dequan Terry APRN - NP [facility] DEP       Requested Prescriptions     Pending Prescriptions Disp Refills    metroNIDAZOLE (FLAGYL) 500 MG tablet 14 tablet 0     Sig: Take 1 tablet by mouth 2 times daily for 7 days

## 2025-02-20 ENCOUNTER — APPOINTMENT (OUTPATIENT)
Dept: HEMATOLOGY/ONCOLOGY | Facility: CLINIC | Age: 70
End: 2025-02-20
Payer: MEDICARE

## 2025-03-05 NOTE — PROGRESS NOTES
History Of Present Illness  Amor Guzmán is a 69 y.o. male presenting to GI clinic for follow up.      No chief complaint on file.     Last office visit A&P Sept 2024  Irritable bowel syndrome with diarrhea  IBS symptoms may be multifactorial.  Patient is status post colon resection for personal history of colon cancer.  He has personal history of C. difficile and pancolitis.  He is also status post cholecystectomy, may have bile acid diarrhea.  Note chronically low lipase, consider EPI  Stool studies  Trial colestipol 1 g by mouth twice daily  Can take Imodium by mouth 4 times daily as needed diarrhea  Orders:    colestipol (Colestid) 1 gram tablet; Take 1 tablet (1 g) by mouth 2 times a day. Take at least 4 hours before or 1 hour after other medications.    loperamide (Imodium) 2 mg capsule; Take 1 capsule (2 mg) by mouth 4 times a day as needed for diarrhea.    Pancreatic Elastase, Fecal; Future    Calprotectin, Fecal; Future  Social History  He reports that he quit smoking about 22 years ago. His smoking use included cigarettes. He has never used smokeless tobacco. He reports that he does not currently use alcohol. He reports that he does not use drugs.  He {NSAIDS (Optional):42905} on a regular basis    Family History  Family History   Problem Relation Name Age of Onset    Other (cardiac disorder) Mother      Hypertension Mother      Lung cancer Mother      Other (cardiac disorder) Father      Hypertension Father      Prostate cancer Father       The patient {Action; does/does not:16530} have a FH of CRC. he {Action; does/does not:14235} have a FH of IBD    Review of Systems      Physical Exam     Last Vital Signs  There were no vitals taken for this visit.     Relevant Results  Lab Results   Component Value Date    WBC 11.2 11/27/2024    HGB 16.7 11/27/2024    HCT 50.6 11/27/2024    MCV 88 11/27/2024     11/27/2024      Lab Results   Component Value Date    GLUCOSE 131 (H) 11/27/2024    CALCIUM 8.9  "11/27/2024     11/27/2024    K 4.1 11/27/2024    CO2 22 11/27/2024     11/27/2024    BUN 16 11/27/2024    CREATININE 0.94 11/27/2024      Lab Results   Component Value Date    ALT 9 (L) 11/27/2024    AST 15 11/27/2024    ALKPHOS 79 11/27/2024    BILITOT 0.8 11/27/2024    BILIDIR 0.1 10/09/2021    INR 1.5 (H) 08/17/2023      Lab Results   Component Value Date    IRON 21 (L) 10/09/2021    TIBC 333 10/09/2021    IRONSAT 6 (L) 10/09/2021    FERRITIN 17 (L) 10/09/2021     No results found for: \"CALPS\", \"CRP\"   Lab Results   Component Value Date    LIPASE 4 (L) 02/16/2023    LIPASE 8 (L) 11/10/2021    LIPASE 5 (L) 02/02/2021    LIPASE 8 (L) 09/29/2020    LIPASE 9 12/13/2018    LIPASE 3 (L) 07/20/2018      Lab Results   Component Value Date    HGBA1C 7.1 (H) 01/05/2024    HGBA1C 10.6 (H) 10/02/2023    HGBA1C 10.9 (A) 08/17/2023    HGBA1C 11.2 (A) 08/16/2023        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 Ellsworth- Impression:            - Z-line, 37 cm from the incisors.                         - Esophageal mucosal changes suspicious for                          short-segment Rossi's esophagus. Biopsied.                         - Erythematous mucosa in the stomach. Biopsied.                         - Erythematous duodenopathy.    COLONOSCOPY 2/28/2022 with Dr. Verdin- Impression:            - Hemorrhoids found on perianal exam.                         - Patent functional end-to-end colo-colonic                          anastomosis, characterized by healthy appearing mucosa.                         - The examination was otherwise normal.                         - No specimens collected.    EGD 9/29/2020 with Dr. Ruff- Impression:            - Single esophageal ulcer in setting of esophagitis.                         - 4 cm hiatal hernia.                         - Clotted blood in the gastric fundus and in the                          gastric body.                   "       - Duodenal ulcer with spurting hemorrhage (Eddie                          Class Ia). Injected. Clips (MR conditional) were                          placed.                         - Erosive duodenitis with hemorrhage.                         - Normal second portion of the duodenum.                         - No specimens collected.    Colonoscopy 11/18/2019 with Dr. Ruff- Impression:              - Patent end-to-side colo-colonic anastomosis, characterized by healthy appearing mucosa and visible sutures.  - Diverticulosis in the sigmoid colon.  - Non-bleeding internal hemorrhoids.  - No specimens collected.    IMAGING  CT abdomen/pelvis w IV contrast 8/13/2021- IMPRESSION:  1.  Status post aortobifemoral bypass which is well opacified and patent. No abdominal aortic aneurysm.  2. Interval resolution of the previously noted colitis.  3. Diverticulosis of the sigmoid colon but without signs of acute diverticulitis free    CT abdomen pelvis w IV contrast 2/2/2021- IMPRESSION:  DIFFUSE, IMPRESSIVE PANCOLITIS; FAVOR INFECTIOUS ETIOLOGY. OTHER  ETIOLOGIES NOT EXCLUDED     NO OTHER ACUTE FINDINGS INCLUDING NO PERFORATION, ABSCESS OR COMPELLING EVIDENCE FOR ACTIVE METASTATIC DISEASE    CT abdomen/pelvis w iv contrast 9/29/2020- IMPRESSION:  Post surgical changes of a recent cholecystectomy. 5 cm x 3.3 cm heterogeneous fluid collection in the gallbladder fossa with a few gas locules.  Findings may secondary to a seroma, abscess, hematoma or possible biloma.     Findings compatible with a gastroenteritis.     Question mild wall thickening of the distal esophagus.  Correlate clinically for possible esophagitis.  Consider follow-up endoscopy.     Colonic diverticulosis without findings of diverticulitis.     Tiny 2 to 3 mm nodules in the right lung base.  Recommend follow-up according to the criteria provided below.    CT chest/abdomen/pelvis w IV contrast 8/24/2020- ABDOMEN/PELVIS:  Stable and intact aortobifemoral  graft. Stable advanced atherosclerotic changes in the abdominal aorta and iliac vessels.     There is a mid transverse colon reanastomosis. Moderate diffuse retained colonic stool. Sigmoid diverticulosis without acute associated inflammation.     Scant scattered small bilateral renal cysts.     Porcelain gallbladder.    CT abdomen/pelvis w IV contrast 12/9/2019- IMPRESSION:  Status post transverse colon resection with reanastomosis. No CT evidence of malignancy or metastatic disease.     Porcelain gallbladder. Consider surgical consultation.     Stable tiny nodular densities at the lung bases measuring 1 and 2 mm likely representing benign etiologies.     Status post Y-shaped aortoiliac graft with patency.    Assessment/Plan   Assessment & Plan             ALBANIA Hodgson-CNP  03/05/25

## 2025-03-07 ENCOUNTER — OFFICE VISIT (OUTPATIENT)
Dept: GASTROENTEROLOGY | Facility: CLINIC | Age: 70
End: 2025-03-07
Payer: MEDICARE

## 2025-03-07 VITALS
RESPIRATION RATE: 20 BRPM | DIASTOLIC BLOOD PRESSURE: 74 MMHG | WEIGHT: 154.6 LBS | HEART RATE: 54 BPM | HEIGHT: 65 IN | SYSTOLIC BLOOD PRESSURE: 151 MMHG | BODY MASS INDEX: 25.76 KG/M2 | OXYGEN SATURATION: 98 % | TEMPERATURE: 98.2 F

## 2025-03-07 DIAGNOSIS — K21.9 GASTROESOPHAGEAL REFLUX DISEASE, UNSPECIFIED WHETHER ESOPHAGITIS PRESENT: Primary | ICD-10-CM

## 2025-03-07 DIAGNOSIS — R19.7 DIARRHEA, UNSPECIFIED: ICD-10-CM

## 2025-03-07 DIAGNOSIS — K58.0 IRRITABLE BOWEL SYNDROME WITH DIARRHEA: ICD-10-CM

## 2025-03-07 PROBLEM — R10.9 ABDOMINAL PAIN: Status: ACTIVE | Noted: 2025-03-07

## 2025-03-07 PROBLEM — D72.829 LEUKOCYTOSIS: Status: ACTIVE | Noted: 2025-03-07

## 2025-03-07 PROBLEM — I63.9 CEREBRAL INFARCTION: Status: ACTIVE | Noted: 2025-03-07

## 2025-03-07 PROBLEM — R20.2 PARESTHESIA: Status: ACTIVE | Noted: 2025-03-07

## 2025-03-07 PROBLEM — I25.2 HISTORY OF MYOCARDIAL INFARCTION: Status: ACTIVE | Noted: 2025-03-07

## 2025-03-07 PROBLEM — D64.9 ANEMIA: Status: ACTIVE | Noted: 2025-03-07

## 2025-03-07 PROBLEM — F32.2 MAJOR DEPRESSIVE DISORDER, SINGLE EPISODE, SEVERE WITHOUT PSYCHOTIC FEATURES (MULTI): Status: ACTIVE | Noted: 2025-03-07

## 2025-03-07 PROBLEM — Z95.1 HISTORY OF CORONARY ARTERY BYPASS SURGERY: Status: ACTIVE | Noted: 2025-03-07

## 2025-03-07 PROBLEM — L03.90 CELLULITIS: Status: ACTIVE | Noted: 2025-03-07

## 2025-03-07 PROBLEM — E11.51 TYPE 2 DIABETES MELLITUS WITH PERIPHERAL ANGIOPATHY: Status: ACTIVE | Noted: 2025-03-07

## 2025-03-07 PROBLEM — F41.1 ANXIETY STATE: Status: ACTIVE | Noted: 2025-03-07

## 2025-03-07 PROBLEM — M12.9 ARTHROPATHY: Status: ACTIVE | Noted: 2025-03-07

## 2025-03-07 PROBLEM — E87.6 HYPOKALEMIA: Status: ACTIVE | Noted: 2025-03-07

## 2025-03-07 PROBLEM — I10 BENIGN ESSENTIAL HYPERTENSION: Status: ACTIVE | Noted: 2025-03-07

## 2025-03-07 PROCEDURE — 99214 OFFICE O/P EST MOD 30 MIN: CPT

## 2025-03-07 PROCEDURE — 1159F MED LIST DOCD IN RCRD: CPT

## 2025-03-07 PROCEDURE — 3008F BODY MASS INDEX DOCD: CPT

## 2025-03-07 PROCEDURE — 3078F DIAST BP <80 MM HG: CPT

## 2025-03-07 PROCEDURE — 3077F SYST BP >= 140 MM HG: CPT

## 2025-03-07 PROCEDURE — 4010F ACE/ARB THERAPY RXD/TAKEN: CPT

## 2025-03-07 PROCEDURE — 1160F RVW MEDS BY RX/DR IN RCRD: CPT

## 2025-03-07 PROCEDURE — 1036F TOBACCO NON-USER: CPT

## 2025-03-07 PROCEDURE — 1126F AMNT PAIN NOTED NONE PRSNT: CPT

## 2025-03-07 RX ORDER — LANCETS 33 GAUGE
EACH MISCELLANEOUS
COMMUNITY
Start: 2025-02-20

## 2025-03-07 RX ORDER — BACLOFEN 10 MG/1
1 TABLET ORAL 2 TIMES DAILY PRN
COMMUNITY
Start: 2025-01-07

## 2025-03-07 RX ORDER — COLESTIPOL HYDROCHLORIDE 1 G/1
1 TABLET ORAL 2 TIMES DAILY
Qty: 180 TABLET | Refills: 3 | Status: CANCELLED | OUTPATIENT
Start: 2025-03-07 | End: 2026-03-07

## 2025-03-07 RX ORDER — PANTOPRAZOLE SODIUM 40 MG/1
40 TABLET, DELAYED RELEASE ORAL
Qty: 90 TABLET | Refills: 3 | Status: SHIPPED | OUTPATIENT
Start: 2025-03-07 | End: 2026-03-07

## 2025-03-07 RX ORDER — CALCIUM CITRATE/VITAMIN D3 200MG-6.25
TABLET ORAL
COMMUNITY
Start: 2024-08-04

## 2025-03-07 RX ORDER — COLESTIPOL HYDROCHLORIDE 1 G/1
1 TABLET ORAL 2 TIMES DAILY
Qty: 180 TABLET | Refills: 3 | Status: SHIPPED | OUTPATIENT
Start: 2025-03-07 | End: 2026-03-07

## 2025-03-07 ASSESSMENT — PAIN SCALES - GENERAL: PAINLEVEL_OUTOF10: 0-NO PAIN

## 2025-03-07 ASSESSMENT — ENCOUNTER SYMPTOMS
ABDOMINAL PAIN: 1
CONSTIPATION: 0
RECTAL PAIN: 0
ANAL BLEEDING: 0
BLOOD IN STOOL: 0
ROS GI COMMENTS: SEE HPI
DIARRHEA: 1

## 2025-03-07 NOTE — ASSESSMENT & PLAN NOTE
It sounds as if symptoms improved after patient started colestipol, however he is not currently taking colestipol and is having diarrhea  Stool studies reordered to rule out infectious, inflammatory etiology, EPI  Colestipol 1 g twice daily reordered  Continue Imodium 4 times daily as needed  Orders:    C. difficile, PCR; Future    Calprotectin, Fecal; Future    H. Pylori Antigen, Stool; Future    Pancreatic Elastase, Fecal; Future    Stool Pathogen Panel, PCR; Future    Ova/Para + Giardia/Cryptosporidium Antigen; Future    colestipol (Colestid) 1 gram tablet; Take 1 tablet (1 g) by mouth 2 times a day. Take at least 4 hours before or 1 hour after other medications.    Follow Up In Gastroenterology; Future

## 2025-03-07 NOTE — PATIENT INSTRUCTIONS
Stool studies  Restart colestipol 1 g by mouth twice daily- take 1 hour after other meds  Can take Imodium by mouth 4 times daily as needed for diarrhea  Continue pantoprazole daily  Follow up in 3 months, after stool studies

## 2025-03-07 NOTE — PROGRESS NOTES
History Of Present Illness  Amor Guzmán is a 69 y.o. male presenting to GI clinic for follow up IBS-D.  He is here with his spouse.    Patient complains of having multiple episodes of diarrhea daily since at least January. He takes loperamide multiple times daily, but it only seems to help a little bit.  Stool is BSS 6-7.  He has at least 4-5 bowel movements daily with some nocturnal awakenings.  After last appointment, he was feeling well for a few months, so he did not complete the stool studies.  He is not currently taking colestipol.  He complains of intermittent RUQ and LLQ abdominal pain as well as bloating/gas.    Social History  He reports that he quit smoking about 22 years ago. His smoking use included cigarettes. He has never used smokeless tobacco. He reports that he does not currently use alcohol. He reports that he does not use drugs.  He does not take NSAIDs on a regular basis    Family History  Family History   Problem Relation Name Age of Onset    Other (cardiac disorder) Mother      Hypertension Mother      Lung cancer Mother      Other (cardiac disorder) Father      Hypertension Father      Prostate cancer Father       The patient does not have a FH of CRC. he does not have a FH of IBD    Review of Systems   Gastrointestinal:  Positive for abdominal pain and diarrhea. Negative for anal bleeding, blood in stool, constipation and rectal pain.        See HPI   All other systems reviewed and are negative.        Physical Exam  Constitutional:       General: He is not in acute distress.     Appearance: Normal appearance. He is not ill-appearing.   HENT:      Head: Normocephalic and atraumatic.      Mouth/Throat:      Mouth: Mucous membranes are moist.   Eyes:      General: No scleral icterus.     Conjunctiva/sclera: Conjunctivae normal.      Pupils: Pupils are equal, round, and reactive to light.   Pulmonary:      Effort: Pulmonary effort is normal.   Abdominal:      General: Bowel sounds are  "normal. There is no distension.      Palpations: Abdomen is soft. There is no mass.      Tenderness: There is no abdominal tenderness.      Hernia: No hernia is present.   Musculoskeletal:         General: Normal range of motion.      Cervical back: Normal range of motion.   Skin:     General: Skin is warm and dry.      Coloration: Skin is not jaundiced or pale.   Neurological:      Mental Status: He is alert. Mental status is at baseline.   Psychiatric:         Mood and Affect: Mood normal.         Behavior: Behavior normal.          Last Vital Signs  /74 (BP Location: Left arm, Patient Position: Sitting, BP Cuff Size: Adult)   Pulse 54   Temp 36.8 °C (98.2 °F)   Resp 20   Ht 1.651 m (5' 5\")   Wt 70.1 kg (154 lb 9.6 oz)   SpO2 98%   BMI 25.73 kg/m²      Relevant Results  Lab Results   Component Value Date    WBC 11.2 11/27/2024    HGB 16.7 11/27/2024    HCT 50.6 11/27/2024    MCV 88 11/27/2024     11/27/2024      Lab Results   Component Value Date    GLUCOSE 131 (H) 11/27/2024    CALCIUM 8.9 11/27/2024     11/27/2024    K 4.1 11/27/2024    CO2 22 11/27/2024     11/27/2024    BUN 16 11/27/2024    CREATININE 0.94 11/27/2024      Lab Results   Component Value Date    ALT 9 (L) 11/27/2024    AST 15 11/27/2024    ALKPHOS 79 11/27/2024    BILITOT 0.8 11/27/2024    BILIDIR 0.1 10/09/2021    INR 1.5 (H) 08/17/2023      Lab Results   Component Value Date    IRON 21 (L) 10/09/2021    TIBC 333 10/09/2021    IRONSAT 6 (L) 10/09/2021    FERRITIN 17 (L) 10/09/2021     No results found for: \"CALPS\", \"CRP\"   Lab Results   Component Value Date    LIPASE 4 (L) 02/16/2023    LIPASE 8 (L) 11/10/2021    LIPASE 5 (L) 02/02/2021    LIPASE 8 (L) 09/29/2020    LIPASE 9 12/13/2018    LIPASE 3 (L) 07/20/2018      Lab Results   Component Value Date    HGBA1C 6.4 (H) 02/04/2025    HGBA1C 7.1 (H) 01/05/2024    HGBA1C 10.6 (H) 10/02/2023    HGBA1C 10.9 (A) 08/17/2023        Hx CRC in 2018 s/p colonic " resection  EGD/COLONOSCOPY/COLOGUARD  EGD 3/8/2023 with Dr. Ellsworth- Impression:              - Z-line, 37 cm from the incisors.  - Esophageal mucosal changes suspicious for short-segment Rossi's esophagus. Biopsied.  - Erythematous mucosa in the stomach. Biopsied.  - Erythematous duodenopathy.    COLONOSCOPY 2/28/2022 with Dr. Verdin- Impression:              - Hemorrhoids found on perianal exam.  - Patent functional end-to-end colo-colonic anastomosis, characterized by healthy appearing mucosa.  - The examination was otherwise normal.  - No specimens collected.    EGD 9/29/2020 with Dr. Ruff- Impression:              - Single esophageal ulcer in setting of esophagitis.  - 4 cm hiatal hernia.  - Clotted blood in the gastric fundus and in the gastric body.  - Duodenal ulcer with spurting hemorrhage (Eddie Class Ia). Injected. Clips (MR conditional) were placed.  - Erosive duodenitis with hemorrhage.  - Normal second portion of the duodenum.  - No specimens collected.    Colonoscopy 11/18/2019 with Dr. Ruff- Impression:              - Patent end-to-side colo-colonic anastomosis, characterized by healthy appearing mucosa and visible sutures.  - Diverticulosis in the sigmoid colon.  - Non-bleeding internal hemorrhoids.  - No specimens collected.    IMAGING  CT abdomen/pelvis w IV contrast 8/13/2021- IMPRESSION:  1.  Status post aortobifemoral bypass which is well opacified and patent. No abdominal aortic aneurysm.  2. Interval resolution of the previously noted colitis.  3. Diverticulosis of the sigmoid colon but without signs of acute diverticulitis free    CT abdomen pelvis w IV contrast 2/2/2021- IMPRESSION:  DIFFUSE, IMPRESSIVE PANCOLITIS; FAVOR INFECTIOUS ETIOLOGY. OTHER  ETIOLOGIES NOT EXCLUDED     NO OTHER ACUTE FINDINGS INCLUDING NO PERFORATION, ABSCESS OR COMPELLING EVIDENCE FOR ACTIVE METASTATIC DISEASE    CT abdomen/pelvis w iv contrast 9/29/2020- IMPRESSION:  Post surgical changes of a recent  cholecystectomy. 5 cm x 3.3 cm heterogeneous fluid collection in the gallbladder fossa with a few gas locules.  Findings may secondary to a seroma, abscess, hematoma or possible biloma.     Findings compatible with a gastroenteritis.     Question mild wall thickening of the distal esophagus.  Correlate clinically for possible esophagitis.  Consider follow-up endoscopy.     Colonic diverticulosis without findings of diverticulitis.     Tiny 2 to 3 mm nodules in the right lung base.  Recommend follow-up according to the criteria provided below.    CT chest/abdomen/pelvis w IV contrast 8/24/2020- ABDOMEN/PELVIS:  Stable and intact aortobifemoral graft. Stable advanced atherosclerotic changes in the abdominal aorta and iliac vessels.     There is a mid transverse colon reanastomosis. Moderate diffuse retained colonic stool. Sigmoid diverticulosis without acute associated inflammation.     Scant scattered small bilateral renal cysts.     Porcelain gallbladder.    CT abdomen/pelvis w IV contrast 12/9/2019- IMPRESSION:  Status post transverse colon resection with reanastomosis. No CT evidence of malignancy or metastatic disease.     Porcelain gallbladder. Consider surgical consultation.     Stable tiny nodular densities at the lung bases measuring 1 and 2 mm likely representing benign etiologies.     Status post Y-shaped aortoiliac graft with patency.    Assessment/Plan   Assessment & Plan  Irritable bowel syndrome with diarrhea  It sounds as if symptoms improved after patient started colestipol, however he is not currently taking colestipol and is having diarrhea  Stool studies reordered to rule out infectious, inflammatory etiology, EPI  Colestipol 1 g twice daily reordered  Continue Imodium 4 times daily as needed  Orders:    C. difficile, PCR; Future    Calprotectin, Fecal; Future    H. Pylori Antigen, Stool; Future    Pancreatic Elastase, Fecal; Future    Stool Pathogen Panel, PCR; Future    Ova/Para +  Giardia/Cryptosporidium Antigen; Future    colestipol (Colestid) 1 gram tablet; Take 1 tablet (1 g) by mouth 2 times a day. Take at least 4 hours before or 1 hour after other medications.    Follow Up In Gastroenterology; Future    Gastroesophageal reflux disease, unspecified whether esophagitis present  History perforated ulcer per patient  Continue pantoprazole daily  Orders:    pantoprazole (ProtoNix) 40 mg EC tablet; Take 1 tablet (40 mg) by mouth once daily.    Follow-up 3 months, After stool studies are completed        ALBANIA Hodgson-SEBASTIÁN  03/07/25

## 2025-03-07 NOTE — ASSESSMENT & PLAN NOTE
History perforated ulcer per patient  Continue pantoprazole daily  Orders:    pantoprazole (ProtoNix) 40 mg EC tablet; Take 1 tablet (40 mg) by mouth once daily.

## 2025-03-10 ENCOUNTER — APPOINTMENT (OUTPATIENT)
Dept: GASTROENTEROLOGY | Facility: CLINIC | Age: 70
End: 2025-03-10
Payer: MEDICARE

## 2025-03-15 LAB
C COLI+JEJUNI+LARI FUSA STL QL NAA+PROBE: NOT DETECTED
CRYPTOSP AG STL QL IA: NORMAL
EC STX1 GENE STL QL NAA+PROBE: NOT DETECTED
EC STX2 GENE STL QL NAA+PROBE: NOT DETECTED
G LAMBLIA AG STL QL IA: NORMAL
NOROV GI+II ORF1-ORF2 JNC STL QL NAA+PR: NOT DETECTED
O+P STL CONC: NORMAL
O+P STL TRI STN: NORMAL
RVA NSP5 STL QL NAA+PROBE: NOT DETECTED
SALMONELLA SP RPOD STL QL NAA+PROBE: NOT DETECTED
SHIGELLA DNA SPEC QL NAA+PROBE: NOT DETECTED
V CHOL+PARA RFBL+TRKH+TNAA STL QL NAA+PR: NOT DETECTED
Y ENTERO RECN STL QL NAA+PROBE: NOT DETECTED

## 2025-03-20 LAB
C DIFF TOX GENS STL QL NAA+PROBE: NOT DETECTED
CALPROTECTIN STL-MCNT: 111 MCG/G
ELASTASE PANC STL-MCNT: 497 MCG/G
H PYLORI AG STL QL IA: NORMAL

## 2025-03-29 LAB
C COLI+JEJUNI+LARI FUSA STL QL NAA+PROBE: NOT DETECTED
CRYPTOSP AG STL QL IA: NORMAL
EC STX1 GENE STL QL NAA+PROBE: NOT DETECTED
EC STX2 GENE STL QL NAA+PROBE: NOT DETECTED
G LAMBLIA AG STL QL IA: NORMAL
NOROV GI+II ORF1-ORF2 JNC STL QL NAA+PR: NOT DETECTED
O+P STL TRI STN: NORMAL
RVA NSP5 STL QL NAA+PROBE: NOT DETECTED
SALMONELLA SP RPOD STL QL NAA+PROBE: NOT DETECTED
SHIGELLA DNA SPEC QL NAA+PROBE: NOT DETECTED
V CHOL+PARA RFBL+TRKH+TNAA STL QL NAA+PR: NOT DETECTED
Y ENTERO RECN STL QL NAA+PROBE: NOT DETECTED

## 2025-04-01 ENCOUNTER — TELEPHONE (OUTPATIENT)
Dept: GASTROENTEROLOGY | Facility: CLINIC | Age: 70
End: 2025-04-01
Payer: MEDICARE

## 2025-04-01 DIAGNOSIS — R19.7 DIARRHEA, UNSPECIFIED TYPE: Primary | ICD-10-CM

## 2025-04-01 NOTE — TELEPHONE ENCOUNTER
----- Message from Radha Webb sent at 4/1/2025 11:59 AM EDT -----  Please call Amor and let him know that their results were unremarkable for infection, EPI.  The fecal calprotectin was borderline elevated.  Given history CRC, I would like them to have a colonoscopy at whatever  location is most convenient to them.

## 2025-04-11 ENCOUNTER — APPOINTMENT (OUTPATIENT)
Dept: HEMATOLOGY/ONCOLOGY | Facility: CLINIC | Age: 70
End: 2025-04-11
Payer: MEDICARE

## 2025-04-11 ENCOUNTER — OFFICE VISIT (OUTPATIENT)
Dept: HEMATOLOGY/ONCOLOGY | Facility: CLINIC | Age: 70
End: 2025-04-11
Payer: MEDICARE

## 2025-04-11 VITALS
HEIGHT: 64 IN | BODY MASS INDEX: 26.25 KG/M2 | SYSTOLIC BLOOD PRESSURE: 130 MMHG | DIASTOLIC BLOOD PRESSURE: 67 MMHG | OXYGEN SATURATION: 98 % | TEMPERATURE: 97 F | HEART RATE: 63 BPM | RESPIRATION RATE: 16 BRPM | WEIGHT: 153.77 LBS

## 2025-04-11 DIAGNOSIS — D72.829 ELEVATED WHITE BLOOD CELL COUNT, UNSPECIFIED: ICD-10-CM

## 2025-04-11 PROCEDURE — 1125F AMNT PAIN NOTED PAIN PRSNT: CPT | Performed by: INTERNAL MEDICINE

## 2025-04-11 PROCEDURE — 4010F ACE/ARB THERAPY RXD/TAKEN: CPT | Performed by: INTERNAL MEDICINE

## 2025-04-11 PROCEDURE — 99204 OFFICE O/P NEW MOD 45 MIN: CPT | Performed by: INTERNAL MEDICINE

## 2025-04-11 PROCEDURE — 99214 OFFICE O/P EST MOD 30 MIN: CPT | Performed by: INTERNAL MEDICINE

## 2025-04-11 PROCEDURE — 3075F SYST BP GE 130 - 139MM HG: CPT | Performed by: INTERNAL MEDICINE

## 2025-04-11 PROCEDURE — 3078F DIAST BP <80 MM HG: CPT | Performed by: INTERNAL MEDICINE

## 2025-04-11 PROCEDURE — 1036F TOBACCO NON-USER: CPT | Performed by: INTERNAL MEDICINE

## 2025-04-11 PROCEDURE — 3008F BODY MASS INDEX DOCD: CPT | Performed by: INTERNAL MEDICINE

## 2025-04-11 PROCEDURE — 1159F MED LIST DOCD IN RCRD: CPT | Performed by: INTERNAL MEDICINE

## 2025-04-11 SDOH — ECONOMIC STABILITY: FOOD INSECURITY: WITHIN THE PAST 12 MONTHS, THE FOOD YOU BOUGHT JUST DIDN'T LAST AND YOU DIDN'T HAVE MONEY TO GET MORE.: NEVER TRUE

## 2025-04-11 SDOH — ECONOMIC STABILITY: FOOD INSECURITY: WITHIN THE PAST 12 MONTHS, YOU WORRIED THAT YOUR FOOD WOULD RUN OUT BEFORE YOU GOT THE MONEY TO BUY MORE.: NEVER TRUE

## 2025-04-11 ASSESSMENT — ENCOUNTER SYMPTOMS
GASTROINTESTINAL NEGATIVE: 1
CARDIOVASCULAR NEGATIVE: 1
RESPIRATORY NEGATIVE: 1
CONSTITUTIONAL NEGATIVE: 1

## 2025-04-11 ASSESSMENT — PAIN SCALES - GENERAL: PAINLEVEL_OUTOF10: 7

## 2025-04-11 NOTE — PATIENT INSTRUCTIONS
Today you met with your hematologist/oncologist.  Recent labs were discussed and questions answered.  Scheduling orders were placed.  While we appreciate that you verbalized understanding, if any questions arise after leaving, please do not hesitate to call the office to discuss.  [phone] Lisa Trejo.    Dr Elmore is discharging you but you need to continue to follow with your PCP. If at any point your white blood cell count doubles, please call us for an appointment.

## 2025-04-11 NOTE — PROGRESS NOTES
"Patient ID: Amor Guzmán is a 69 y.o. male.  Referring Physician: Sophie Srivastava DO  [address] [place] [address]  Primary Care Provider: Sophie Srivastava DO  Visit Type:  Initial Visit     Subjective    HPI  Lymphocytosis  Review of Systems   Constitutional: Negative.    HENT:  Negative.     Respiratory: Negative.     Cardiovascular: Negative.    Gastrointestinal: Negative.         Objective   BSA: 1.78 meters squared  /67 (BP Location: Right arm, Patient Position: Sitting, BP Cuff Size: Adult)   Pulse 63   Temp 36.1 °C (97 °F) (Temporal)   Resp 16   Ht (S) 1.634 m (5' 4.33\")   Wt 69.7 kg (153 lb 12.3 oz)   SpO2 98%   BMI 26.12 kg/m²      has a past medical history of Chronic or unspecified duodenal ulcer with hemorrhage (03/12/2021), Encounter for follow-up examination after completed treatment for conditions other than malignant neoplasm (11/01/2021), Encounter for general adult medical examination without abnormal findings (04/15/2022), Encounter for preprocedural cardiovascular examination (09/18/2020), Encounter for screening for malignant neoplasm of colon (01/14/2022), Encounter for screening for other viral diseases (01/05/2021), Enterocolitis due to Clostridium difficile, not specified as recurrent (11/02/2021), Immunization not carried out because of patient refusal (10/16/2020), Immunization not carried out because of patient refusal (10/16/2020), Infection following a procedure, other surgical site, initial encounter (11/12/2021), Infection following a procedure, other surgical site, initial encounter (11/12/2021), Infectious gastroenteritis and colitis, unspecified (03/17/2021), Low back pain, unspecified (09/29/2021), Male erectile dysfunction, unspecified (07/08/2021), Other abnormal glucose (10/16/2020), Other conditions influencing health status (04/15/2022), Other forms of angina pectoris (07/08/2021), Other specified diseases of gallbladder " (09/02/2020), Other specified diseases of gallbladder (10/07/2020), Other specified symptoms and signs involving the circulatory and respiratory systems (09/24/2021), Pain in unspecified hip (09/01/2021), Pain in unspecified thigh (09/01/2021), Personal history of diseases of the blood and blood-forming organs and certain disorders involving the immune mechanism (11/01/2021), Personal history of diseases of the skin and subcutaneous tissue (11/12/2021), Personal history of other diseases of the digestive system (11/02/2021), Personal history of other diseases of the musculoskeletal system and connective tissue (09/29/2021), Personal history of other endocrine, nutritional and metabolic disease (09/21/2021), Personal history of other endocrine, nutritional and metabolic disease (11/01/2021), Personal history of other infectious and parasitic diseases (11/12/2021), Personal history of other malignant neoplasm of large intestine (09/16/2021), Personal history of other malignant neoplasm of large intestine, Personal history of other specified conditions (06/24/2021), Personal history of other specified conditions (03/17/2021), Personal history of other specified conditions (09/21/2021), Personal history of peptic ulcer disease, Presence of aortocoronary bypass graft (11/17/2021), Presence of aortocoronary bypass graft (11/22/2021), and Unspecified systolic (congestive) heart failure (09/24/2021).   has a past surgical history that includes Other surgical history (10/16/2020); Other surgical history (10/16/2020); Other surgical history (11/01/2021); MR angio neck wo IV contrast (8/26/2022); MR angio head wo IV contrast (8/26/2022); CT angio neck (8/26/2022); and CT angio head w and wo IV contrast (8/26/2022).  Family History   Problem Relation Name Age of Onset    Other (cardiac disorder) Mother      Hypertension Mother      Lung cancer Mother      Other (cardiac disorder) Father      Hypertension Father      Prostate  cancer Father       Oncology History    No history exists.       Amor Guzmán  reports that he quit smoking about 22 years ago. His smoking use included cigarettes. He has never used smokeless tobacco.  He  reports that he does not currently use alcohol.  He  reports no history of drug use.    Physical Exam  Constitutional:       Appearance: Normal appearance.   HENT:      Head: Normocephalic and atraumatic.   Eyes:      Extraocular Movements: Extraocular movements intact.      Pupils: Pupils are equal, round, and reactive to light.   Abdominal:      General: Abdomen is flat.      Palpations: Abdomen is soft.   Neurological:      Mental Status: He is alert.         WBC   Date/Time Value Ref Range Status   11/27/2024 05:55 AM 11.2 4.4 - 11.3 x10*3/uL Final   08/17/2023 06:31 AM 7.4 4.4 - 11.3 x10E9/L Final   08/16/2023 10:02 AM 8.0 4.4 - 11.3 x10E9/L Final   05/01/2023 10:22 PM 10.3 4.4 - 11.3 x10E9/L Final     nRBC   Date Value Ref Range Status   11/27/2024 0.0 0.0 - 0.0 /100 WBCs Final   10/25/2021 0.0 0.0 - 0.0 /100 WBC Final   10/24/2021 0.0 0.0 - 0.0 /100 WBC Final   10/23/2021 0.0 0.0 - 0.0 /100 WBC Final     RBC   Date Value Ref Range Status   11/27/2024 5.77 4.50 - 5.90 x10*6/uL Final   08/17/2023 5.20 4.50 - 5.90 x10E12/L Final   08/16/2023 5.71 4.50 - 5.90 x10E12/L Final   05/01/2023 5.45 4.50 - 5.90 x10E12/L Final     Hemoglobin   Date Value Ref Range Status   11/27/2024 16.7 13.5 - 17.5 g/dL Final   08/17/2023 14.7 13.5 - 17.5 g/dL Final   08/16/2023 16.4 13.5 - 17.5 g/dL Final   05/01/2023 16.1 13.5 - 17.5 g/dL Final     Hematocrit   Date Value Ref Range Status   11/27/2024 50.6 41.0 - 52.0 % Final   08/17/2023 45.4 41.0 - 52.0 % Final   08/16/2023 48.7 41.0 - 52.0 % Final   05/01/2023 46.8 41.0 - 52.0 % Final     MCV   Date/Time Value Ref Range Status   11/27/2024 05:55 AM 88 80 - 100 fL Final   08/17/2023 06:31 AM 87 80 - 100 fL Final   08/16/2023 10:02 AM 85 80 - 100 fL Final   05/01/2023 10:22 PM  "86 80 - 100 fL Final     MCH   Date/Time Value Ref Range Status   11/27/2024 05:55 AM 28.9 26.0 - 34.0 pg Final     MCHC   Date/Time Value Ref Range Status   11/27/2024 05:55 AM 33.0 32.0 - 36.0 g/dL Final   08/17/2023 06:31 AM 32.4 32.0 - 36.0 g/dL Final   08/16/2023 10:02 AM 33.7 32.0 - 36.0 g/dL Final   05/01/2023 10:22 PM 34.4 32.0 - 36.0 g/dL Final     RDW   Date/Time Value Ref Range Status   11/27/2024 05:55 AM 13.9 11.5 - 14.5 % Final   08/17/2023 06:31 AM 13.1 11.5 - 14.5 % Final   08/16/2023 10:02 AM 13.1 11.5 - 14.5 % Final   05/01/2023 10:22 PM 13.1 11.5 - 14.5 % Final     Platelets   Date/Time Value Ref Range Status   11/27/2024 05:55  150 - 450 x10*3/uL Final   08/17/2023 06:31  150 - 450 x10E9/L Final   08/16/2023 10:02  150 - 450 x10E9/L Final   05/01/2023 10:22  150 - 450 x10E9/L Final     No results found for: \"MPV\"  Neutrophils %   Date/Time Value Ref Range Status   11/27/2024 05:55 AM 59.0 40.0 - 80.0 % Final   08/16/2023 10:02 AM 65.0 40.0 - 80.0 % Final   05/01/2023 10:22 PM 56.5 40.0 - 80.0 % Final   02/16/2023 01:53 PM 73.5 40.0 - 80.0 % Final     Immature Granulocytes %, Automated   Date/Time Value Ref Range Status   11/27/2024 05:55 AM 0.4 0.0 - 0.9 % Final     Comment:     Immature Granulocyte Count (IG) includes promyelocytes, myelocytes and metamyelocytes but does not include bands. Percent differential counts (%) should be interpreted in the context of the absolute cell counts (cells/UL).   08/16/2023 10:02 AM 0.5 0.0 - 0.9 % Final     Comment:      Immature Granulocyte Count (IG) includes promyelocytes,    myelocytes and metamyelocytes but does not include bands.   Percent differential counts (%) should be interpreted in the   context of the absolute cell counts (cells/L).     05/01/2023 10:22 PM 0.9 0.0 - 0.9 % Final     Comment:      Immature Granulocyte Count (IG) includes promyelocytes,    myelocytes and metamyelocytes but does not include bands.   Percent " differential counts (%) should be interpreted in the   context of the absolute cell counts (cells/L).     02/16/2023 01:53 PM 1.1 (H) 0.0 - 0.9 % Final     Comment:      Immature Granulocyte Count (IG) includes promyelocytes,    myelocytes and metamyelocytes but does not include bands.   Percent differential counts (%) should be interpreted in the   context of the absolute cell counts (cells/L).       Lymphocytes %   Date/Time Value Ref Range Status   11/27/2024 05:55 AM 29.9 13.0 - 44.0 % Final   08/16/2023 10:02 AM 21.7 13.0 - 44.0 % Final   05/01/2023 10:22 PM 31.1 13.0 - 44.0 % Final   02/16/2023 01:53 PM 12.8 13.0 - 44.0 % Final     Monocytes %   Date/Time Value Ref Range Status   11/27/2024 05:55 AM 7.6 2.0 - 10.0 % Final   08/16/2023 10:02 AM 9.9 2.0 - 10.0 % Final   05/01/2023 10:22 PM 8.9 2.0 - 10.0 % Final   02/16/2023 01:53 PM 11.7 2.0 - 10.0 % Final     Eosinophils %   Date/Time Value Ref Range Status   11/27/2024 05:55 AM 2.7 0.0 - 6.0 % Final   08/16/2023 10:02 AM 2.1 0.0 - 6.0 % Final   05/01/2023 10:22 PM 2.1 0.0 - 6.0 % Final   02/16/2023 01:53 PM 0.3 0.0 - 6.0 % Final     Basophils %   Date/Time Value Ref Range Status   11/27/2024 05:55 AM 0.4 0.0 - 2.0 % Final   08/16/2023 10:02 AM 0.8 0.0 - 2.0 % Final   05/01/2023 10:22 PM 0.5 0.0 - 2.0 % Final   02/16/2023 01:53 PM 0.6 0.0 - 2.0 % Final     Neutrophils Absolute   Date/Time Value Ref Range Status   11/27/2024 05:55 AM 6.63 1.20 - 7.70 x10*3/uL Final     Comment:     Percent differential counts (%) should be interpreted in the context of the absolute cell counts (cells/uL).   08/16/2023 10:02 AM 5.19 1.20 - 7.70 x10E9/L Final   05/01/2023 10:22 PM 5.84 1.20 - 7.70 x10E9/L Final   02/16/2023 01:53 PM 7.95 (H) 1.20 - 7.70 x10E9/L Final     Immature Granulocytes Absolute, Automated   Date/Time Value Ref Range Status   11/27/2024 05:55 AM 0.05 0.00 - 0.70 x10*3/uL Final     Lymphocytes Absolute   Date/Time Value Ref Range Status   11/27/2024 05:55 AM  "3.36 1.20 - 4.80 x10*3/uL Final   08/16/2023 10:02 AM 1.73 1.20 - 4.80 x10E9/L Final   05/01/2023 10:22 PM 3.21 1.20 - 4.80 x10E9/L Final   02/16/2023 01:53 PM 1.39 1.20 - 4.80 x10E9/L Final     Monocytes Absolute   Date/Time Value Ref Range Status   11/27/2024 05:55 AM 0.85 0.10 - 1.00 x10*3/uL Final   08/16/2023 10:02 AM 0.79 0.10 - 1.00 x10E9/L Final   05/01/2023 10:22 PM 0.92 0.10 - 1.00 x10E9/L Final   02/16/2023 01:53 PM 1.27 (H) 0.10 - 1.00 x10E9/L Final     Eosinophils Absolute   Date/Time Value Ref Range Status   11/27/2024 05:55 AM 0.30 0.00 - 0.70 x10*3/uL Final   08/16/2023 10:02 AM 0.17 0.00 - 0.70 x10E9/L Final   05/01/2023 10:22 PM 0.22 0.00 - 0.70 x10E9/L Final   02/16/2023 01:53 PM 0.03 0.00 - 0.70 x10E9/L Final     Basophils Absolute   Date/Time Value Ref Range Status   11/27/2024 05:55 AM 0.05 0.00 - 0.10 x10*3/uL Final   08/16/2023 10:02 AM 0.06 0.00 - 0.10 x10E9/L Final   05/01/2023 10:22 PM 0.05 0.00 - 0.10 x10E9/L Final   02/16/2023 01:53 PM 0.07 0.00 - 0.10 x10E9/L Final       No components found for: \"PT\"  aPTT   Date/Time Value Ref Range Status   08/17/2023 06:31 AM 37 27 - 38 sec Final     Comment:     Note new reference range as of 6/20/2023 at 10:00am.   05/01/2023 10:22 PM 36 26 - 39 sec Final     Comment:       THE APTT IS NO LONGER USED FOR MONITORING     UNFRACTIONATED HEPARIN THERAPY.    FOR MONITORING HEPARIN THERAPY,     USE THE HEPARIN ASSAY.     10/17/2021 12:33 AM 27 25 - 35 sec Final     Comment:       THE APTT IS NO LONGER USED FOR MONITORING     UNFRACTIONATED HEPARIN THERAPY.    FOR MONITORING HEPARIN THERAPY,     USE THE HEPARIN ASSAY.         Assessment/Plan      Lymphocytosis: Order Flow cytometry: CBC yearly: No indication to intervene from Heme standpoint. DC follow PCP Re consult if WBC doubles in one year..     Diagnoses and all orders for this visit:  Elevated white blood cell count, unspecified  -     Referral to [facility] (Incoming)           Gary-Tutu " MD Ramírez

## 2025-04-21 ENCOUNTER — TELEPHONE (OUTPATIENT)
Dept: PREADMISSION TESTING | Facility: HOSPITAL | Age: 70
End: 2025-04-21
Payer: MEDICARE

## 2025-04-21 NOTE — TELEPHONE ENCOUNTER
SURGERY PRE-OPERATIVE INSTRUCTIONS    *You will receive a phone call the day before your procedure  after 2pm, (or the Friday before your surgery if scheduled on a Monday.) Generally the hospital will be calling you with this information after that time.    *If you are taking GLP1 medications for type 2 diabetes or weight loss, hold these medications on the day of the procedure. START a clear liquid diet 24 hours before your surgery. On the day of your surgery/procedure, STOP all clear liquids 2 hours before your arrival time to the hospital. A clear liquid diet consists of clear liquids and foods that melt into a clear liquid. Clear liquids can and should contain sugar. This is only if you are taking a GLP1 medication.     *You are not to eat after midnight the night before the surgery. You may have up to 10 ounces of clear liquids up until 2 hours prior to arriving to the hospital. The exception is with medications you were instructed to take day of surgery.     *You may take tylenol for pain/discomfort as needed.     *Stop taking all aspirin products, ibuprofen (motrin/advil), naproxen (aleve/naprosyn) for one week prior to surgery.    *Stop taking all vitamins and supplements one week prior to surgery.     *You should not have alcoholic beverages for 24 hours before surgery.     *You should not smoke 24 hours prior to surgery.     *To help prevent surgical infections bathe/shower with Dial soap the evening before surgery.    *You can wear deodorant but no lotion, powder, or perfume/cologne. You should remove all make-up and nail polish at home.    *If you wear glasses, please bring a case for the glasses with you.    *You will be asked to remove dentures and contacts.     *Please leave all valuables at home.    *You should wear loose, comfortable clothing that will accommodate bandages and/or casts.    *You should notify your doctor of any change in your condition (fever, cold, rash, etc). Surgery may need to be  re-scheduled until a time you are in better health.    *A responsible adult is required to accompany you to and from the hospital if you are receiving anesthesia or a sedative. Patients are not permitted to drive for 24 hours after anesthesia.     *You can use the [employer] parking if you wish.     *If you have any further questions please call [phone].

## 2025-04-21 NOTE — TELEPHONE ENCOUNTER
PT STATES HE LAST TOOK CLOPIDROGEL AND ELIQUIS YESTERDAY.  HE TOOK JARDIANCE THIS AM AND WAS INSTRUCTED NOT TO TAKE ANY MORE TIL AFTER PROCEDURE.INSTRUCTED TO ONLY TAKE 1/2 DOSE BASIGLAR INSULIN EVENING PRIOR TO PROCEDURE. NO INSULIN MORNING OF. WILL NOTIFY GI TEAM OF PTS MEDS THAT WERE NOT STOPPED PRIOR TO MY PHONE CALL.

## 2025-04-23 ENCOUNTER — APPOINTMENT (OUTPATIENT)
Dept: OPERATING ROOM | Facility: HOSPITAL | Age: 70
End: 2025-04-23
Payer: MEDICARE

## 2025-05-12 ENCOUNTER — OFFICE VISIT (OUTPATIENT)
Dept: CARDIOLOGY | Facility: HOSPITAL | Age: 70
End: 2025-05-12
Payer: MEDICARE

## 2025-05-12 VITALS
DIASTOLIC BLOOD PRESSURE: 73 MMHG | HEART RATE: 54 BPM | BODY MASS INDEX: 26.4 KG/M2 | SYSTOLIC BLOOD PRESSURE: 143 MMHG | OXYGEN SATURATION: 96 % | WEIGHT: 155.42 LBS

## 2025-05-12 DIAGNOSIS — I10 HYPERTENSION, UNSPECIFIED TYPE: Primary | ICD-10-CM

## 2025-05-12 DIAGNOSIS — I25.10 CORONARY ARTERY DISEASE INVOLVING NATIVE CORONARY ARTERY OF NATIVE HEART WITHOUT ANGINA PECTORIS: ICD-10-CM

## 2025-05-12 DIAGNOSIS — I25.10 CORONARY ARTERY DISEASE INVOLVING NATIVE HEART WITHOUT ANGINA PECTORIS, UNSPECIFIED VESSEL OR LESION TYPE: ICD-10-CM

## 2025-05-12 DIAGNOSIS — E78.5 HYPERLIPIDEMIA, UNSPECIFIED HYPERLIPIDEMIA TYPE: ICD-10-CM

## 2025-05-12 DIAGNOSIS — I48.91 ATRIAL FIBRILLATION, UNSPECIFIED TYPE (MULTI): ICD-10-CM

## 2025-05-12 PROCEDURE — 99214 OFFICE O/P EST MOD 30 MIN: CPT | Performed by: NURSE PRACTITIONER

## 2025-05-12 PROCEDURE — 1036F TOBACCO NON-USER: CPT | Performed by: NURSE PRACTITIONER

## 2025-05-12 PROCEDURE — 3077F SYST BP >= 140 MM HG: CPT | Performed by: NURSE PRACTITIONER

## 2025-05-12 PROCEDURE — 1159F MED LIST DOCD IN RCRD: CPT | Performed by: NURSE PRACTITIONER

## 2025-05-12 PROCEDURE — 3078F DIAST BP <80 MM HG: CPT | Performed by: NURSE PRACTITIONER

## 2025-05-12 PROCEDURE — 4010F ACE/ARB THERAPY RXD/TAKEN: CPT | Performed by: NURSE PRACTITIONER

## 2025-05-12 PROCEDURE — G2211 COMPLEX E/M VISIT ADD ON: HCPCS | Performed by: NURSE PRACTITIONER

## 2025-05-12 RX ORDER — ISOSORBIDE MONONITRATE 120 MG/1
120 TABLET, EXTENDED RELEASE ORAL NIGHTLY
Qty: 90 TABLET | Refills: 3 | Status: SHIPPED | OUTPATIENT
Start: 2025-05-12 | End: 2026-05-12

## 2025-05-12 ASSESSMENT — ENCOUNTER SYMPTOMS
PSYCHIATRIC NEGATIVE: 1
HEMATOLOGIC/LYMPHATIC NEGATIVE: 1
ENDOCRINE NEGATIVE: 1
GASTROINTESTINAL NEGATIVE: 1
EYES NEGATIVE: 1
RESPIRATORY NEGATIVE: 1
NEUROLOGICAL NEGATIVE: 1
DYSPNEA ON EXERTION: 1
MYALGIAS: 1

## 2025-05-12 NOTE — PROGRESS NOTES
Referred by Dr. Francis ref. provider found for No chief complaint on file.     History Of Present Illness:    Amor Guzmán is a very pleasant 69 year old gentleman with a history of CAD, PVD, HTN and HLD, he is here for a follow up visit. The patient is seen in collaboration with Dr. Woodruff. Mr. Guzmán states he has been feeling well. Denies chest pain, shortness of breath or heart palpitations. Continues to stay active working in the yard.       Review of Systems   Constitutional: Positive for malaise/fatigue.   HENT: Negative.     Eyes: Negative.    Cardiovascular:  Positive for dyspnea on exertion.   Respiratory: Negative.     Endocrine: Negative.    Hematologic/Lymphatic: Negative.    Skin: Negative.    Musculoskeletal:  Positive for arthritis, muscle weakness and myalgias.   Gastrointestinal: Negative.    Neurological: Negative.    Psychiatric/Behavioral: Negative.        Past Medical History:  He has a past medical history of Chronic or unspecified duodenal ulcer with hemorrhage (03/12/2021), Encounter for follow-up examination after completed treatment for conditions other than malignant neoplasm (11/01/2021), Encounter for general adult medical examination without abnormal findings (04/15/2022), Encounter for preprocedural cardiovascular examination (09/18/2020), Encounter for screening for malignant neoplasm of colon (01/14/2022), Encounter for screening for other viral diseases (01/05/2021), Enterocolitis due to Clostridium difficile, not specified as recurrent (11/02/2021), Immunization not carried out because of patient refusal (10/16/2020), Immunization not carried out because of patient refusal (10/16/2020), Infection following a procedure, other surgical site, initial encounter (11/12/2021), Infection following a procedure, other surgical site, initial encounter (11/12/2021), Infectious gastroenteritis and colitis, unspecified (03/17/2021), Low back pain, unspecified (09/29/2021), Male erectile  dysfunction, unspecified (07/08/2021), Other abnormal glucose (10/16/2020), Other conditions influencing health status (04/15/2022), Other forms of angina pectoris (07/08/2021), Other specified diseases of gallbladder (09/02/2020), Other specified diseases of gallbladder (10/07/2020), Other specified symptoms and signs involving the circulatory and respiratory systems (09/24/2021), Pain in unspecified hip (09/01/2021), Pain in unspecified thigh (09/01/2021), Personal history of diseases of the blood and blood-forming organs and certain disorders involving the immune mechanism (11/01/2021), Personal history of diseases of the skin and subcutaneous tissue (11/12/2021), Personal history of other diseases of the digestive system (11/02/2021), Personal history of other diseases of the musculoskeletal system and connective tissue (09/29/2021), Personal history of other endocrine, nutritional and metabolic disease (09/21/2021), Personal history of other endocrine, nutritional and metabolic disease (11/01/2021), Personal history of other infectious and parasitic diseases (11/12/2021), Personal history of other malignant neoplasm of large intestine (09/16/2021), Personal history of other malignant neoplasm of large intestine, Personal history of other specified conditions (06/24/2021), Personal history of other specified conditions (03/17/2021), Personal history of other specified conditions (09/21/2021), Personal history of peptic ulcer disease, Presence of aortocoronary bypass graft (11/17/2021), Presence of aortocoronary bypass graft (11/22/2021), and Unspecified systolic (congestive) heart failure (09/24/2021).    Past Surgical History:  He has a past surgical history that includes Other surgical history (10/16/2020); Other surgical history (10/16/2020); Other surgical history (11/01/2021); MR angio neck wo IV contrast (08/26/2022); MR angio head wo IV contrast (08/26/2022); CT angio neck (08/26/2022); CT angio head w  "and wo IV contrast (08/26/2022); Laparoscopic left colon resection (2018); Vascular surgery; Coronary artery bypass graft; Colonoscopy; and Esophagogastroduodenoscopy.      Social History:  He reports that he quit smoking about 22 years ago. His smoking use included cigarettes. He has never used smokeless tobacco. He reports that he does not currently use alcohol. He reports that he does not use drugs.    Family History:  Family History   Problem Relation Name Age of Onset    Other (cardiac disorder) Mother      Hypertension Mother      Lung cancer Mother      Other (cardiac disorder) Father      Hypertension Father      Prostate cancer Father          Allergies:  Lovastatin    Outpatient Medications:  Current Outpatient Medications   Medication Instructions    acetaminophen (TYLENOL) 650 mg, Every 6 hours PRN    atorvastatin (LIPITOR) 80 mg, oral, Nightly    baclofen (Lioresal) 10 mg tablet 1 tablet, 2 times daily PRN    Basaglar KwikPen U-100 Insulin 16 Units, 2 times daily    cetirizine (ZYRTEC) 10 mg, Daily    clopidogrel (PLAVIX) 75 mg, oral, Daily    colestipol (COLESTID) 1 g, oral, 2 times daily, Take at least 4 hours before or 1 hour after other medications.    dicyclomine (BENTYL) 10 mg, Daily    Eliquis 5 mg, oral, 2 times daily    empagliflozin (JARDIANCE ORAL) Daily    isosorbide mononitrate ER (IMDUR) 120 mg, oral, Nightly    lisinopril 10 mg, oral, Daily    melatonin 10 mg tablet Take by mouth. 1 qhs prn    metoprolol tartrate (LOPRESSOR) 25 mg, oral, 2 times daily    multivitamin with minerals (MULTIPLE VITAMIN-MINERALS ORAL) 1 tablet, Daily    pantoprazole (PROTONIX) 40 mg, oral, Daily RT    ranolazine (RANEXA) 500 mg, oral, Every 12 hours    tamsulosin (FLOMAX) 0.4 mg, oral, Nightly    True Metrix Glucose Test Strip strip Uses aurelia metrix system, is to test once daily before breakfast.    Unifine Pentips Plus 32 gauge x 5/32\" needle Uses 2 daily        Last Recorded Vitals:  Vitals:    05/12/25 0959 "   BP: 143/73   Pulse: 54   SpO2: 96%   Weight: 70.5 kg (155 lb 6.8 oz)       Physical Exam:  Physical Exam  Vitals reviewed.   HENT:      Head: Normocephalic.      Nose: Nose normal.   Eyes:      Pupils: Pupils are equal, round, and reactive to light.   Cardiovascular:      Rate and Rhythm: Normal rate and regular rhythm.   Pulmonary:      Effort: Pulmonary effort is normal.      Breath sounds: Normal breath sounds.   Abdominal:      General: Abdomen is flat.      Palpations: Abdomen is soft.   Musculoskeletal:         General: Normal range of motion.      Cervical back: Normal range of motion.   Skin:     General: Skin is warm and dry.   Neurological:      General: No focal deficit present.      Mental Status: He is alert and oriented to person, place, and time.   Psychiatric:         Mood and Affect: Mood normal.            Last Labs:  CBC -  Lab Results   Component Value Date    WBC 11.2 11/27/2024    HGB 16.7 11/27/2024    HCT 50.6 11/27/2024    MCV 88 11/27/2024     11/27/2024       CMP -  Lab Results   Component Value Date    CALCIUM 8.9 11/27/2024    PHOS 2.6 08/27/2022    PROT 6.4 11/27/2024    ALBUMIN 3.9 11/27/2024    AST 15 11/27/2024    ALT 9 (L) 11/27/2024    ALKPHOS 79 11/27/2024    BILITOT 0.8 11/27/2024       LIPID PANEL -   Lab Results   Component Value Date    CHOL CANCELED 05/03/2023    TRIG CANCELED 05/03/2023    HDL CANCELED 05/03/2023    CHHDL CANCELED 05/03/2023    LDLF CANCELED 05/03/2023    VLDL CANCELED 05/03/2023    NHDL CANCELED 05/03/2023       RENAL FUNCTION PANEL -   Lab Results   Component Value Date    GLUCOSE 131 (H) 11/27/2024     11/27/2024    K 4.1 11/27/2024     11/27/2024    CO2 22 11/27/2024    ANIONGAP 14 11/27/2024    BUN 16 11/27/2024    CREATININE 0.94 11/27/2024    GFRMALE >90 08/18/2023    CALCIUM 8.9 11/27/2024    PHOS 2.6 08/27/2022    ALBUMIN 3.9 11/27/2024        Lab Results   Component Value Date    BNP 78 08/26/2022    HGBA1C 6.4 (H) 02/04/2025        Last Cardiology Tests:  ECG:    Echo:  Echocardiogram 5/2/2023  1. Left ventricular systolic function is normal with a 55-60% estimated ejection fraction.  2. Abnormal septal motion consistent with post-operative status.  3. Spectral Doppler shows an impaired relaxation pattern of left ventricular diastolic filling.  4. There is mild mitral, tricuspid and pulmonic regurgitation.    Echocardiogram 8/26/2022  1. Left ventricular systolic function is normal with a 55-60% estimated ejection fraction.  2. There is mild mitral and tricuspid regurgitation.  3. There is no evidence of a patent foramen ovale.    Echocardiogram 10/18/2021  1. The left ventricular systolic function is low normal with a 50-55% estimated  ejection fraction.  2. Apical septal segment is abnormal.  3. Abnormal septal motion consistent with post-operative status.  4. The left atrium is mild to moderately dilated.  5. RVSP within normal limits.  6. Small pericardial effusion.    Echocardiogram 10/11/2021  1. The left ventricular systolic function is normal with a 55-60% estimated  ejection fraction.  2. Abnormal septal motion consistent with post-operative status.  3. Spectral Doppler shows an impaired relaxation pattern of left ventricular  diastolic filling.  4. There is eccentric left ventricular hypertrophy.  5. RVSP within normal limits.    Echocardiogram 6/23/2021  1. The left ventricular systolic function is normal with a 55-60% estimated  ejection fraction.  2. Spectral Doppler shows an abnormal pattern of left ventricular diastolic  filling.  3. There is trace-mild mitral, tricuspid and pulmonic regurgitation.      Echocardiogram 10/5/2020   1. The left ventricular systolic function is mildly decreased with a 45%  estimated ejection fraction.  2. Apical septal segment, apical anterior segment, and apex are abnormal.  3. Spectral Doppler shows an impaired relaxation pattern of left ventricular  diastolic filling.  4. There is mild  tricuspid regurgitation.  5. The estimated pulmonary artery pressure is mildly elevated with the RVSP at  39.9 mmHg.    Echocardiogram 7/23/2018   1. The left ventricular systolic function is normal with a 55-60% estimated  ejection fraction.   2. Spectral Doppler shows an impaired relaxation pattern of left ventricular  diastolic filling.  Ejection Fractions:  LVEF 55-60%  Cath:  Heart cath 5/12/2023  . Left main: no significant angiographic disease.  2. LAD: 70% diffuse proximal ISR, 90% mid-ISR.  3. LCx: 100%: proximal ISR.  4. RCA: nondominant, 99% proximal .  5. Grafts: (1) Patent RUBEN to LAD (2) Patent SVG-Ramus (3) patent radial jump graft off SVG to LPDA.  6. LVEDP 13mmHg, no aortic stenosis on LV-Ao gradient.    Left heart cath 10/5/2021  1. Left main: no significant angiographic disease.  2. LAD: 90% diffuse prox-mid ISR.  3. LCx: 95% proximal ISR.  4. RCA: 40% mid-vessel disease.  5. Grafts: (1) LIMA to LAD atretic/occluded (2) SVG to ?ramus patent without  disease.  6. LVEDP 9mmHg, no aortic stenosis on LV-Ao gradient.    Left heart cath 10/4/2020   1. The ramus intermedius showed severe atherosclerotic disease.  2. Severe multivessel CAD with evidence of chronically occluded LIMA and patent  SVG to RI.  3. Evidence of severe mid LAD ISR (99%) with CELY I flow S/P successful IVUS  guided POBA.  4. Given the history of recent severe GIB, angiomax infusion was initiated in  lab, to be continued at low dose for a total of 6 hours post PCI.  5. Future laser atherectomy and PCI of severe LAD ISR vs brachytherapy could be  considered of the patient's GIB is treated    Cardiac cath 4/16/2015   * There is significant single-vessel and branch coronary artery disease  in this right dominant system. The left main is unremarkable. The  ostial to proximal LAD stent has a 70% ostial in-stent restenotic  lesion. The mid LAD has a focal 70% in-stent restenotic lesion. The  more distal LAD has mild diffuse disease.  The large ramus has a 60%  ostial stenosis. The LCx has a 50% mid vessel in-stent restenotic  lesion. The dominant right coronary artery has an elongated 40%  proximal to mid vessel lesion.  * The left subclavian is normal, and the LIMA is a good caliber vessel  if needed for CABG surgery.  * The aortobifemoral graft is widely patent and free of significant  disease.    Cardiac cath 3/17/2011   * Angiographic summary: LM: patent; LCx: patent including widely patent  OM2 stent; LAD: patent proximal stent but high grade stenosis of the  midLAD at site of prior stenting x 2; RCA: patent.  * LVEDP=12mmHg at the end of the case.  * Abdominal aortogram: Patent renal arteries with 50% right RA stenosis;  Patent aorto-bifemoral bypass.  * Cath done via the right radial artery without apparent complication.  * Successful PCI of the mid LAD with high pressure PTCA alone (no  additional stents used) using a 2.75mm quamtum maverick balloon.  Stress Test:  Nuclear stress test 6/23/2021  1. No stress-induced ischemia or infarct.  2. Normal LV size with normal LVEF.  3. Mild septal dyskinesis consistent with prior CABG.       Cardiac Imaging:  Operative report 10/15/2021  1. Standard redo median sternotomy.  2. Standard cannulation.  3. Coronary artery bypass graft x2 with the left internal mammary   artery to the left anterior descending and left radial artery   to the posterolateral circumflex.         Assessment/Plan   Mr. Guzmán is a very pleasant 69 year old gentleman with a history of CVA, HTN, HLD, PVD s/p aortobifemoral bypass, and CAD s/p CABG x3 ( sequential skeletonized left internal mammary artery to diagonal and left anterior descending and endoscopic saphenous vein graft to ramus.), he was admitted 10/2020 with an NSTEMI, with a PTCA to 95% mid-LAD ISR, he had recently been treated for a GIB with clipping and epi for a duodenal ulcer. Several days later he continued to have increased unstable angina and had a PCI  to severe ostial LAD, prox LAD ISR and mid LAD, he was recently admitted (10/3/2021)with chest pain, LHC (10/5/2021) showed severe LAD and LCx severely obstructed in-stent restenosis. He was transferred to [facility] and had a redo CABG on 10/15/2021, CABG x2 with left internal mammary artery to the left anterior descending and left radial artery to the posterolateral circumflex. He continues to stay active working in his yard. Heart rate and blood pressure are well controlled today.     Plan  -call with any questions   -follow up in six months   -monitor blood pressure at home   -continue Plavix 75 mg daily indefinitely and Eliquis 5 mg twice a day   -continue Atorvastatin 80 mg daily, and Metoprolol 25 mg twice a day   -continue Imdur 120 mg daily and Ranexa 500 mg twice a day   -continue Lisinopril 10 mg daily         Meghna Ricardo, APRN-CNP

## 2025-06-04 NOTE — PROGRESS NOTES
History Of Present Illness  Amor Guzmán is a 69 y.o. male presenting to GI clinic for follow up IBS D, GERD, Rossi's.    Pt here for a follow up. Pt states that he never followed up with colonoscopy. Pt states he is back to normal, no abdominal pain, no diarrhea, BRBPR, melena, hematochezia.  Moving bowels once or twice daily, formed, sometimes soft.  He is not taking colestipol at this time.    He is compliant with his pantoprazole and has no GERD symptoms.     Past medical history  Past Medical History:   Diagnosis Date    Chronic or unspecified duodenal ulcer with hemorrhage 03/12/2021    Bleeding duodenal ulcer    Encounter for follow-up examination after completed treatment for conditions other than malignant neoplasm 11/01/2021    Hospital discharge follow-up    Encounter for general adult medical examination without abnormal findings 04/15/2022    Medicare annual wellness visit, initial    Encounter for preprocedural cardiovascular examination 09/18/2020    Preop cardiovascular exam    Encounter for screening for malignant neoplasm of colon 01/14/2022    Encounter for screening colonoscopy    Encounter for screening for other viral diseases 01/05/2021    Need for hepatitis C screening test    Enterocolitis due to Clostridium difficile, not specified as recurrent 11/02/2021    Clostridium difficile diarrhea    Immunization not carried out because of patient refusal 10/16/2020    Pneumococcal vaccination declined    Immunization not carried out because of patient refusal 10/16/2020    Influenza vaccination declined by patient    Infection following a procedure, other surgical site, initial encounter 11/12/2021    Incisional infection    Infection following a procedure, other surgical site, initial encounter 11/12/2021    Incisional infection    Infectious gastroenteritis and colitis, unspecified 03/17/2021    Colitis - presumed infectious origin    Low back pain, unspecified 09/29/2021    Lumbar pain     Male erectile dysfunction, unspecified 07/08/2021    Vasculogenic erectile dysfunction, unspecified vasculogenic erectile dysfunction type    Other abnormal glucose 10/16/2020    Abnormal blood sugar    Other conditions influencing health status 04/15/2022    DM (diabetes mellitus) type II uncontrolled, periph vascular disorder    Other forms of angina pectoris 07/08/2021    Angina at rest    Other specified diseases of gallbladder 09/02/2020    Porcelain gallbladder    Other specified diseases of gallbladder 10/07/2020    Porcelain gallbladder    Other specified symptoms and signs involving the circulatory and respiratory systems 09/24/2021    Carotid bruit    Pain in unspecified hip 09/01/2021    Joint pain, hip    Pain in unspecified thigh 09/01/2021    Pain of thigh, unspecified laterality    Personal history of diseases of the blood and blood-forming organs and certain disorders involving the immune mechanism 11/01/2021    History of anemia    Personal history of diseases of the skin and subcutaneous tissue 11/12/2021    History of cellulitis    Personal history of other diseases of the digestive system 11/02/2021    History of duodenal ulcer    Personal history of other diseases of the musculoskeletal system and connective tissue 09/29/2021    History of low back pain    Personal history of other endocrine, nutritional and metabolic disease 09/21/2021    History of hyperglycemia    Personal history of other endocrine, nutritional and metabolic disease 11/01/2021    History of diabetes mellitus    Personal history of other infectious and parasitic diseases 11/12/2021    History of Clostridium difficile colitis    Personal history of other malignant neoplasm of large intestine 09/16/2021    History of colon cancer    Personal history of other malignant neoplasm of large intestine     History of malignant neoplasm of colon    Personal history of other specified conditions 06/24/2021    History of urinary  hesitancy    Personal history of other specified conditions 03/17/2021    History of diarrhea    Personal history of other specified conditions 09/21/2021    History of abdominal pain    Personal history of peptic ulcer disease     History of bleeding peptic ulcer    Presence of aortocoronary bypass graft 11/17/2021    S/P CABG x 2    Presence of aortocoronary bypass graft 11/22/2021    S/P CABG (coronary artery bypass graft)    Unspecified systolic (congestive) heart failure 09/24/2021    ACC/AHA stage B systolic heart failure due to ischemic cardiomyopathy      Past Surgical History:   Procedure Laterality Date    COLONOSCOPY      MULTIPLE    CORONARY ARTERY BYPASS GRAFT      X3 2021    CT ANGIO NECK  08/26/2022    CT NECK ANGIO W AND WO IV CONTRAST 8/26/2022 [facility]    CT HEAD ANGIO W AND WO IV CONTRAST  08/26/2022    CT HEAD ANGIO W AND WO IV CONTRAST 8/26/2022 [facility]    ESOPHAGOGASTRODUODENOSCOPY      MULTIPLE    LAPAROSCOPIC LEFT COLON RESECTION  2018    MR HEAD ANGIO WO IV CONTRAST  08/26/2022    MR HEAD ANGIO WO IV CONTRAST 8/26/2022 [facility]    MR NECK ANGIO WO IV CONTRAST  08/26/2022    MR NECK ANGIO WO IV CONTRAST 8/26/2022 [facility]    OTHER SURGICAL HISTORY  10/16/2020    Gallbladder surgery    OTHER SURGICAL HISTORY  10/16/2020    Cardiac catheterization    OTHER SURGICAL HISTORY  11/01/2021    Coronary artery bypass graft    VASCULAR SURGERY      AORTO BIFEMORAL BYPASS GRAFT      Social History  He reports that he quit smoking about 22 years ago. His smoking use included cigarettes. He has never used smokeless tobacco. He reports that he does not currently use alcohol. He reports that he does not use drugs.  He does not take NSAIDs on a regular basis    Family History  Family History[1]  The patient does not have a FH of CRC. he does not have a FH of IBD    Review of Systems   Gastrointestinal:         See HPI   All other systems reviewed and are  "negative.        Physical Exam  Constitutional:       General: He is not in acute distress.     Appearance: Normal appearance. He is not ill-appearing.   HENT:      Head: Normocephalic and atraumatic.      Mouth/Throat:      Mouth: Mucous membranes are moist.   Eyes:      General: No scleral icterus.     Conjunctiva/sclera: Conjunctivae normal.      Pupils: Pupils are equal, round, and reactive to light.   Pulmonary:      Effort: Pulmonary effort is normal.   Abdominal:      General: Bowel sounds are normal. There is no distension.      Palpations: Abdomen is soft. There is no mass.      Tenderness: There is no abdominal tenderness.      Hernia: No hernia is present.   Musculoskeletal:         General: Normal range of motion.      Cervical back: Normal range of motion.   Skin:     General: Skin is warm and dry.      Coloration: Skin is not jaundiced or pale.   Neurological:      Mental Status: He is alert. Mental status is at baseline.   Psychiatric:         Mood and Affect: Mood normal.         Behavior: Behavior normal.          Last Vital Signs  /84 (BP Location: Right arm, Patient Position: Sitting, BP Cuff Size: Adult)   Pulse 52   Temp 36.8 °C (98.2 °F) (Temporal)   Resp 16   Ht 1.651 m (5' 5\")   Wt 70.3 kg (155 lb)   SpO2 98%   BMI 25.79 kg/m²      Relevant Results  Lab Results   Component Value Date    WBC 11.2 11/27/2024    HGB 16.7 11/27/2024    HCT 50.6 11/27/2024    MCV 88 11/27/2024     11/27/2024       Lab Results   Component Value Date    GLUCOSE 131 (H) 11/27/2024    CALCIUM 8.9 11/27/2024     11/27/2024    K 4.1 11/27/2024    CO2 22 11/27/2024     11/27/2024    BUN 16 11/27/2024    CREATININE 0.94 11/27/2024       Lab Results   Component Value Date    ALT 9 (L) 11/27/2024    AST 15 11/27/2024    ALKPHOS 79 11/27/2024    BILITOT 0.8 11/27/2024    BILIDIR 0.1 10/09/2021    INR 1.5 (H) 08/17/2023       Lab Results   Component Value Date    IRON 21 (L) 10/09/2021    TIBC " "333 10/09/2021    IRONSAT 6 (L) 10/09/2021    FERRITIN 17 (L) 10/09/2021       Lab Results   Component Value Date    CALPS 111 03/13/2025      No results found for: \"CRP\"    Lab Results   Component Value Date    LIPASE 4 (L) 02/16/2023    LIPASE 8 (L) 11/10/2021    LIPASE 5 (L) 02/02/2021    LIPASE 8 (L) 09/29/2020    LIPASE 9 12/13/2018    LIPASE 3 (L) 07/20/2018       Lab Results   Component Value Date    HGBA1C 6.4 (H) 02/04/2025    HGBA1C 7.1 (H) 01/05/2024    HGBA1C 10.6 (H) 10/02/2023    HGBA1C 10.9 (A) 08/17/2023    HGBA1C 11.2 (A) 08/16/2023       Latest Reference Range & Units 03/13/25 12:13   CRYPTOSPORIDIUM ANTIGEN, EIA  NOT DETECTED   GIARDIA AG, EIA, STOOL  NOT DETECTED   CAMPYLOBACTER GROUP NOT DETECTED  NOT DETECTED   SALMONELLA SPECIES NOT DETECTED  NOT DETECTED   SHIGELLA SPECIES NOT DETECTED  NOT DETECTED   VIBRIO GROUP NOT DETECTED  NOT DETECTED   YERSINIA ENTEROCOLITICA NOT DETECTED  NOT DETECTED   SHIGA TOXIN 1 NOT DETECTED  NOT DETECTED   SHIGA TOXIN 2 NOT DETECTED  NOT DETECTED   NOROVIRUS GI/GII NOT DETECTED  NOT DETECTED   ROTAVIRUS A NOT DETECTED  NOT DETECTED   HELICOBACTER PYLORI AG, EIA, STOOL  NOT DETECTED   CLOSTRIDIUM DIFFICILE TOXINB,QL REAL TIME PCR NOT DETECTED  NOT DETECTED   PANCREATIC ELASTASE 1 >200 mcg/g 497   OVA AND PARASITES, CONC AND PERM SMEAR  NOT DETECTED   CALPROTECTIN, STOOL mcg/g 111     Hx CRC in 2018 s/p colonic resection  EGD/COLONOSCOPY/COLOGUARD  Colonoscopy ordered due to borderline elevated calprotectin, not performed since symptoms resolved    EGD 3/8/2023 with Dr. Ellsworth- Impression:              - Z-line, 37 cm from the incisors.  - Esophageal mucosal changes suspicious for short-segment Rossi's esophagus. Biopsied.  - Erythematous mucosa in the stomach. Biopsied.  - Erythematous duodenopathy.     FINAL DIAGNOSIS  A.  ANTRUM AND BODY:    -- GASTRIC OXYNTIC-TYPE MUCOSA WITH NO SIGNIFICANT PATHOLOGIC CHANGE.  -- NO HELICOBACTER PYLORI IDENTIFIED ON " ROUTINE STAINED SLIDES.    B.  GE JUNCTION:    -- GASTRIC AND SQUAMOUS ESOPHAGEAL MUCOSA WITH INTESTINAL METAPLASIA; NEGATIVE FOR DYSPLASIA.  SEE COMMENT.    Comment:  In the proper endoscopic setting, the morphologic findings are consistent with  Rossi's Esophagus, negative for dysplasia.  Further clinical and endoscopic correlation is recommended.       COLONOSCOPY 2/28/2022 with Dr. Verdin- Impression:              - Hemorrhoids found on perianal exam.  - Patent functional end-to-end colo-colonic anastomosis, characterized by healthy appearing mucosa.  - The examination was otherwise normal.  - No specimens collected.     EGD 9/29/2020 with Dr. Ruff- Impression:              - Single esophageal ulcer in setting of esophagitis.  - 4 cm hiatal hernia.  - Clotted blood in the gastric fundus and in the gastric body.  - Duodenal ulcer with spurting hemorrhage (Eddie Class Ia). Injected. Clips (MR CNS Response) were placed.  - Erosive duodenitis with hemorrhage.  - Normal second portion of the duodenum.  - No specimens collected.     Colonoscopy 11/18/2019 with Dr. Ruff- Impression:              - Patent end-to-side colo-colonic anastomosis, characterized by healthy appearing mucosa and visible sutures.  - Diverticulosis in the sigmoid colon.  - Non-bleeding internal hemorrhoids.  - No specimens collected.    MOST RECENT PERTINENT IMAGING  CT abd/pelvic w IV contrast 2/16/2023-   Impression   Liquid content is noted throughout the colon consistent with the  patient's diarrhea. Bowel wall thickening or mesenteric inflammation  is noted.       CT abdomen/pelvis w IV contrast 8/13/2021- IMPRESSION:  1.  Status post aortobifemoral bypass which is well opacified and patent. No abdominal aortic aneurysm.  2. Interval resolution of the previously noted colitis.  3. Diverticulosis of the sigmoid colon but without signs of acute diverticulitis free     CT abdomen pelvis w IV contrast 2/2/2021- IMPRESSION:  DIFFUSE,  IMPRESSIVE PANCOLITIS; FAVOR INFECTIOUS ETIOLOGY. OTHER  ETIOLOGIES NOT EXCLUDED     NO OTHER ACUTE FINDINGS INCLUDING NO PERFORATION, ABSCESS OR COMPELLING EVIDENCE FOR ACTIVE METASTATIC DISEASE     CT abdomen/pelvis w iv contrast 9/29/2020- IMPRESSION:  Post surgical changes of a recent cholecystectomy. 5 cm x 3.3 cm heterogeneous fluid collection in the gallbladder fossa with a few gas locules.  Findings may secondary to a seroma, abscess, hematoma or possible biloma.     Findings compatible with a gastroenteritis.     Question mild wall thickening of the distal esophagus.  Correlate clinically for possible esophagitis.  Consider follow-up endoscopy.     Colonic diverticulosis without findings of diverticulitis.     Tiny 2 to 3 mm nodules in the right lung base.  Recommend follow-up according to the criteria provided below.     CT chest/abdomen/pelvis w IV contrast 8/24/2020- ABDOMEN/PELVIS:  Stable and intact aortobifemoral graft. Stable advanced atherosclerotic changes in the abdominal aorta and iliac vessels.     There is a mid transverse colon reanastomosis. Moderate diffuse retained colonic stool. Sigmoid diverticulosis without acute associated inflammation.     Scant scattered small bilateral renal cysts.     Porcelain gallbladder.     CT abdomen/pelvis w IV contrast 12/9/2019- IMPRESSION:  Status post transverse colon resection with reanastomosis. No CT evidence of malignancy or metastatic disease.     Porcelain gallbladder. Consider surgical consultation.     Stable tiny nodular densities at the lung bases measuring 1 and 2 mm likely representing benign etiologies.     Status post Y-shaped aortoiliac graft with patency.    Assessment/Plan   Assessment & Plan  Gastroesophageal reflux disease, unspecified whether esophagitis present  GERD symptoms well-controlled on pantoprazole, continue       Irritable bowel syndrome with diarrhea  Overall doing well and having regular bowel movements, has not needed  Imodium or colestipol  Would have patient restart colestipol 1-2 times daily if symptoms flare  Orders:    Follow Up In Gastroenterology    Elevated fecal calprotectin  Will repeat given elevated calprotectin.  If it is still elevated, would have patient complete ordered colonoscopy  Orders:    Calprotectin, Fecal; Future    Rossi's esophagus without dysplasia  Continue PPI daily          Follow-up yearly and as needed        Radha Webb, [place]-CNP  06/09/25          [1]   Family History  Problem Relation Name Age of Onset    Other (cardiac disorder) Mother      Hypertension Mother      Lung cancer Mother      Other (cardiac disorder) Father      Hypertension Father      Prostate cancer Father

## 2025-06-09 ENCOUNTER — APPOINTMENT (OUTPATIENT)
Dept: GASTROENTEROLOGY | Facility: CLINIC | Age: 70
End: 2025-06-09
Payer: MEDICARE

## 2025-06-09 VITALS
HEART RATE: 52 BPM | HEIGHT: 65 IN | SYSTOLIC BLOOD PRESSURE: 129 MMHG | DIASTOLIC BLOOD PRESSURE: 84 MMHG | BODY MASS INDEX: 25.83 KG/M2 | OXYGEN SATURATION: 98 % | RESPIRATION RATE: 16 BRPM | TEMPERATURE: 98.2 F | WEIGHT: 155 LBS

## 2025-06-09 DIAGNOSIS — K22.70 BARRETT'S ESOPHAGUS WITHOUT DYSPLASIA: ICD-10-CM

## 2025-06-09 DIAGNOSIS — K21.9 GASTROESOPHAGEAL REFLUX DISEASE, UNSPECIFIED WHETHER ESOPHAGITIS PRESENT: Primary | ICD-10-CM

## 2025-06-09 DIAGNOSIS — K58.0 IRRITABLE BOWEL SYNDROME WITH DIARRHEA: ICD-10-CM

## 2025-06-09 DIAGNOSIS — R19.5 ELEVATED FECAL CALPROTECTIN: ICD-10-CM

## 2025-06-09 PROCEDURE — 1160F RVW MEDS BY RX/DR IN RCRD: CPT

## 2025-06-09 PROCEDURE — 4010F ACE/ARB THERAPY RXD/TAKEN: CPT

## 2025-06-09 PROCEDURE — 3074F SYST BP LT 130 MM HG: CPT

## 2025-06-09 PROCEDURE — 1159F MED LIST DOCD IN RCRD: CPT

## 2025-06-09 PROCEDURE — 1036F TOBACCO NON-USER: CPT

## 2025-06-09 PROCEDURE — 99214 OFFICE O/P EST MOD 30 MIN: CPT

## 2025-06-09 PROCEDURE — 3079F DIAST BP 80-89 MM HG: CPT

## 2025-06-09 PROCEDURE — 1125F AMNT PAIN NOTED PAIN PRSNT: CPT

## 2025-06-09 PROCEDURE — 3008F BODY MASS INDEX DOCD: CPT

## 2025-06-09 ASSESSMENT — PATIENT HEALTH QUESTIONNAIRE - PHQ9
2. FEELING DOWN, DEPRESSED OR HOPELESS: NOT AT ALL
SUM OF ALL RESPONSES TO PHQ9 QUESTIONS 1 AND 2: 0
1. LITTLE INTEREST OR PLEASURE IN DOING THINGS: NOT AT ALL

## 2025-06-09 ASSESSMENT — PAIN SCALES - GENERAL: PAINLEVEL_OUTOF10: 5

## 2025-06-09 ASSESSMENT — COLUMBIA-SUICIDE SEVERITY RATING SCALE - C-SSRS
1. IN THE PAST MONTH, HAVE YOU WISHED YOU WERE DEAD OR WISHED YOU COULD GO TO SLEEP AND NOT WAKE UP?: NO
6. HAVE YOU EVER DONE ANYTHING, STARTED TO DO ANYTHING, OR PREPARED TO DO ANYTHING TO END YOUR LIFE?: NO
2. HAVE YOU ACTUALLY HAD ANY THOUGHTS OF KILLING YOURSELF?: NO

## 2025-06-09 ASSESSMENT — ENCOUNTER SYMPTOMS: ROS GI COMMENTS: SEE HPI

## 2025-06-09 NOTE — ASSESSMENT & PLAN NOTE
Overall doing well and having regular bowel movements, has not needed Imodium or colestipol  Would have patient restart colestipol 1-2 times daily if symptoms flare  Orders:    Follow Up In Gastroenterology

## 2025-06-23 DIAGNOSIS — E78.5 HYPERLIPIDEMIA, UNSPECIFIED: ICD-10-CM

## 2025-06-23 RX ORDER — ATORVASTATIN CALCIUM 80 MG/1
80 TABLET, FILM COATED ORAL NIGHTLY
Qty: 90 TABLET | Refills: 3 | Status: SHIPPED | OUTPATIENT
Start: 2025-06-23

## 2025-08-31 DIAGNOSIS — I25.10 ATHEROSCLEROTIC HEART DISEASE OF NATIVE CORONARY ARTERY WITHOUT ANGINA PECTORIS: ICD-10-CM

## 2025-09-02 RX ORDER — RANOLAZINE 500 MG/1
500 TABLET, EXTENDED RELEASE ORAL EVERY 12 HOURS
Qty: 180 TABLET | Refills: 3 | Status: SHIPPED | OUTPATIENT
Start: 2025-09-02

## 2025-11-12 ENCOUNTER — APPOINTMENT (OUTPATIENT)
Dept: UROLOGY | Facility: CLINIC | Age: 70
End: 2025-11-12
Payer: MEDICARE